# Patient Record
Sex: FEMALE | Race: WHITE | Employment: UNEMPLOYED | URBAN - METROPOLITAN AREA
[De-identification: names, ages, dates, MRNs, and addresses within clinical notes are randomized per-mention and may not be internally consistent; named-entity substitution may affect disease eponyms.]

---

## 2018-07-01 PROBLEM — R10.9 RIGHT FLANK PAIN: Status: ACTIVE | Noted: 2018-07-01

## 2018-07-01 PROBLEM — Z34.82 NORMAL PREGNANCY IN MULTIGRAVIDA IN SECOND TRIMESTER: Status: ACTIVE | Noted: 2018-07-01

## 2018-07-01 PROBLEM — Z32.02 PREGNANCY EXAMINATION OR TEST, NEGATIVE RESULT: Status: ACTIVE | Noted: 2018-07-01

## 2018-07-01 PROBLEM — R31.9 HEMATURIA: Status: ACTIVE | Noted: 2018-07-01

## 2018-09-30 ENCOUNTER — ANESTHESIA EVENT (OUTPATIENT)
Dept: LABOR AND DELIVERY | Age: 23
End: 2018-09-30
Payer: COMMERCIAL

## 2018-09-30 ENCOUNTER — ANESTHESIA (OUTPATIENT)
Dept: LABOR AND DELIVERY | Age: 23
End: 2018-09-30
Payer: COMMERCIAL

## 2018-09-30 ENCOUNTER — HOSPITAL ENCOUNTER (INPATIENT)
Age: 23
LOS: 2 days | Discharge: HOME | End: 2018-10-02
Attending: OBSTETRICS & GYNECOLOGY | Admitting: OBSTETRICS & GYNECOLOGY
Payer: COMMERCIAL

## 2018-09-30 DIAGNOSIS — G89.18 POST-OP PAIN: Primary | ICD-10-CM

## 2018-09-30 PROBLEM — O26.90 PREGNANCY RELATED CONDITION: Status: ACTIVE | Noted: 2018-09-30

## 2018-09-30 LAB
AMORPHOUS: ABNORMAL /HPF
AMPHETAMINES: ABNORMAL
BACTERIA: ABNORMAL /HPF
BARBITURATE SCREEN URINE: NEGATIVE
BENZODIAZEPINE SCREEN, URINE: NEGATIVE
BILIRUBIN URINE: NEGATIVE MG/DL
BLOOD, URINE: NEGATIVE
CANNABINOID SCREEN URINE: ABNORMAL
CLARITY: CLEAR
COCAINE METABOLITE: NEGATIVE
COLOR: YELLOW
GLUCOSE, URINE: NEGATIVE MG/DL
HCT VFR BLD CALC: 32.2 % (ref 37–47)
HEMOGLOBIN: 10.5 GM/DL (ref 12.5–16)
KETONES, URINE: NEGATIVE MG/DL
LEUKOCYTE ESTERASE, URINE: NEGATIVE
MCH RBC QN AUTO: 30.4 PG (ref 27–31)
MCHC RBC AUTO-ENTMCNC: 32.6 % (ref 32–36)
MCV RBC AUTO: 93.3 FL (ref 78–100)
MUCUS: ABNORMAL HPF
NITRITE URINE, QUANTITATIVE: NEGATIVE
OPIATES, URINE: NEGATIVE
OXYCODONE: NEGATIVE
PDW BLD-RTO: 13.3 % (ref 11.7–14.9)
PH, URINE: 7 (ref 5–8)
PHENCYCLIDINE, URINE: NEGATIVE
PLATELET # BLD: 271 K/CU MM (ref 140–440)
PMV BLD AUTO: 9.1 FL (ref 7.5–11.1)
PROTEIN UA: 30 MG/DL
RBC # BLD: 3.45 M/CU MM (ref 4.2–5.4)
RBC URINE: 4 /HPF (ref 0–6)
SPECIFIC GRAVITY UA: 1.02 (ref 1–1.03)
SQUAMOUS EPITHELIAL: 1 /HPF
TRANSITIONAL EPITHELIAL: <1 /HPF
TRICHOMONAS: ABNORMAL /HPF
UROBILINOGEN, URINE: 1 MG/DL (ref 0.2–1)
WBC # BLD: 8 K/CU MM (ref 4–10.5)
WBC UA: 1 /HPF (ref 0–5)

## 2018-09-30 PROCEDURE — 2580000003 HC RX 258

## 2018-09-30 PROCEDURE — 86900 BLOOD TYPING SEROLOGIC ABO: CPT

## 2018-09-30 PROCEDURE — G0480 DRUG TEST DEF 1-7 CLASSES: HCPCS

## 2018-09-30 PROCEDURE — 3E033VJ INTRODUCTION OF OTHER HORMONE INTO PERIPHERAL VEIN, PERCUTANEOUS APPROACH: ICD-10-PCS | Performed by: OBSTETRICS & GYNECOLOGY

## 2018-09-30 PROCEDURE — 85027 COMPLETE CBC AUTOMATED: CPT

## 2018-09-30 PROCEDURE — 1220000000 HC SEMI PRIVATE OB R&B

## 2018-09-30 PROCEDURE — 6360000002 HC RX W HCPCS

## 2018-09-30 PROCEDURE — 86850 RBC ANTIBODY SCREEN: CPT

## 2018-09-30 PROCEDURE — 6360000002 HC RX W HCPCS: Performed by: NURSE ANESTHETIST, CERTIFIED REGISTERED

## 2018-09-30 PROCEDURE — 2500000003 HC RX 250 WO HCPCS: Performed by: NURSE ANESTHETIST, CERTIFIED REGISTERED

## 2018-09-30 PROCEDURE — 3700000025 ANESTHESIA EPIDURAL BLOCK: Performed by: NURSE ANESTHETIST, CERTIFIED REGISTERED

## 2018-09-30 PROCEDURE — 86901 BLOOD TYPING SEROLOGIC RH(D): CPT

## 2018-09-30 PROCEDURE — 3E0R3BZ INTRODUCTION OF ANESTHETIC AGENT INTO SPINAL CANAL, PERCUTANEOUS APPROACH: ICD-10-PCS | Performed by: OBSTETRICS & GYNECOLOGY

## 2018-09-30 PROCEDURE — 81001 URINALYSIS AUTO W/SCOPE: CPT

## 2018-09-30 PROCEDURE — 2580000003 HC RX 258: Performed by: OBSTETRICS & GYNECOLOGY

## 2018-09-30 PROCEDURE — 6360000002 HC RX W HCPCS: Performed by: OBSTETRICS & GYNECOLOGY

## 2018-09-30 RX ORDER — ROPIVACAINE HYDROCHLORIDE 2 MG/ML
INJECTION, SOLUTION EPIDURAL; INFILTRATION; PERINEURAL PRN
Status: DISCONTINUED | OUTPATIENT
Start: 2018-09-30 | End: 2018-09-30 | Stop reason: SDUPTHER

## 2018-09-30 RX ORDER — RANITIDINE 150 MG/1
150 TABLET ORAL 2 TIMES DAILY PRN
COMMUNITY
End: 2021-09-16 | Stop reason: ALTCHOICE

## 2018-09-30 RX ORDER — LIDOCAINE HYDROCHLORIDE AND EPINEPHRINE 15; 5 MG/ML; UG/ML
INJECTION, SOLUTION EPIDURAL PRN
Status: DISCONTINUED | OUTPATIENT
Start: 2018-09-30 | End: 2018-09-30 | Stop reason: SDUPTHER

## 2018-09-30 RX ORDER — FENTANYL CITRATE 50 UG/ML
100 INJECTION, SOLUTION INTRAMUSCULAR; INTRAVENOUS
Status: DISCONTINUED | OUTPATIENT
Start: 2018-09-30 | End: 2018-10-01 | Stop reason: HOSPADM

## 2018-09-30 RX ORDER — ROPIVACAINE HYDROCHLORIDE 2 MG/ML
12 INJECTION, SOLUTION EPIDURAL; INFILTRATION; PERINEURAL CONTINUOUS
Status: DISCONTINUED | OUTPATIENT
Start: 2018-09-30 | End: 2018-10-02 | Stop reason: HOSPADM

## 2018-09-30 RX ORDER — SODIUM CHLORIDE, SODIUM LACTATE, POTASSIUM CHLORIDE, CALCIUM CHLORIDE 600; 310; 30; 20 MG/100ML; MG/100ML; MG/100ML; MG/100ML
INJECTION, SOLUTION INTRAVENOUS CONTINUOUS
Status: DISCONTINUED | OUTPATIENT
Start: 2018-09-30 | End: 2018-10-01

## 2018-09-30 RX ORDER — NALOXONE HYDROCHLORIDE 0.4 MG/ML
0.4 INJECTION, SOLUTION INTRAMUSCULAR; INTRAVENOUS; SUBCUTANEOUS PRN
Status: DISCONTINUED | OUTPATIENT
Start: 2018-09-30 | End: 2018-10-01 | Stop reason: HOSPADM

## 2018-09-30 RX ORDER — MISOPROSTOL 200 UG/1
800 TABLET ORAL PRN
Status: CANCELLED | OUTPATIENT
Start: 2018-09-30

## 2018-09-30 RX ORDER — TERBUTALINE SULFATE 1 MG/ML
0.25 INJECTION, SOLUTION SUBCUTANEOUS ONCE
Status: DISCONTINUED | OUTPATIENT
Start: 2018-09-30 | End: 2018-10-01 | Stop reason: HOSPADM

## 2018-09-30 RX ORDER — ONDANSETRON 2 MG/ML
4 INJECTION INTRAMUSCULAR; INTRAVENOUS EVERY 6 HOURS PRN
Status: DISCONTINUED | OUTPATIENT
Start: 2018-09-30 | End: 2018-10-01 | Stop reason: HOSPADM

## 2018-09-30 RX ORDER — NICOTINE 21 MG/24HR
1 PATCH, TRANSDERMAL 24 HOURS TRANSDERMAL DAILY
Status: CANCELLED | OUTPATIENT
Start: 2018-10-01

## 2018-09-30 RX ORDER — FENTANYL CITRATE 50 UG/ML
INJECTION, SOLUTION INTRAMUSCULAR; INTRAVENOUS PRN
Status: DISCONTINUED | OUTPATIENT
Start: 2018-09-30 | End: 2018-09-30 | Stop reason: SDUPTHER

## 2018-09-30 RX ORDER — NALBUPHINE HCL 10 MG/ML
5 AMPUL (ML) INJECTION EVERY 4 HOURS PRN
Status: DISCONTINUED | OUTPATIENT
Start: 2018-09-30 | End: 2018-10-01 | Stop reason: HOSPADM

## 2018-09-30 RX ORDER — FAMOTIDINE 20 MG/1
20 TABLET, FILM COATED ORAL 2 TIMES DAILY
COMMUNITY
End: 2021-09-16 | Stop reason: ALTCHOICE

## 2018-09-30 RX ORDER — METHYLERGONOVINE MALEATE 0.2 MG/ML
200 INJECTION INTRAVENOUS
Status: CANCELLED | OUTPATIENT
Start: 2018-09-30 | End: 2018-09-30

## 2018-09-30 RX ORDER — SODIUM CHLORIDE, SODIUM LACTATE, POTASSIUM CHLORIDE, CALCIUM CHLORIDE 600; 310; 30; 20 MG/100ML; MG/100ML; MG/100ML; MG/100ML
INJECTION, SOLUTION INTRAVENOUS
Status: COMPLETED
Start: 2018-09-30 | End: 2018-09-30

## 2018-09-30 RX ADMIN — SODIUM CHLORIDE, POTASSIUM CHLORIDE, SODIUM LACTATE AND CALCIUM CHLORIDE: 600; 310; 30; 20 INJECTION, SOLUTION INTRAVENOUS at 08:46

## 2018-09-30 RX ADMIN — ROPIVACAINE HYDROCHLORIDE 5 ML: 2 INJECTION, SOLUTION EPIDURAL; INFILTRATION at 18:38

## 2018-09-30 RX ADMIN — SODIUM CHLORIDE, POTASSIUM CHLORIDE, SODIUM LACTATE AND CALCIUM CHLORIDE: 600; 310; 30; 20 INJECTION, SOLUTION INTRAVENOUS at 16:49

## 2018-09-30 RX ADMIN — ROPIVACAINE HYDROCHLORIDE 4 ML: 2 INJECTION, SOLUTION EPIDURAL; INFILTRATION at 20:01

## 2018-09-30 RX ADMIN — LIDOCAINE HYDROCHLORIDE,EPINEPHRINE BITARTRATE 3 ML: 15; .005 INJECTION, SOLUTION EPIDURAL; INFILTRATION; INTRACAUDAL; PERINEURAL at 18:34

## 2018-09-30 RX ADMIN — ROPIVACAINE HYDROCHLORIDE 10 ML: 2 INJECTION, SOLUTION EPIDURAL; INFILTRATION at 22:20

## 2018-09-30 RX ADMIN — ROPIVACAINE HYDROCHLORIDE 12 ML/HR: 2 INJECTION, SOLUTION EPIDURAL; INFILTRATION at 18:42

## 2018-09-30 RX ADMIN — LIDOCAINE HYDROCHLORIDE,EPINEPHRINE BITARTRATE 3 ML: 15; .005 INJECTION, SOLUTION EPIDURAL; INFILTRATION; INTRACAUDAL; PERINEURAL at 22:20

## 2018-09-30 RX ADMIN — SODIUM CHLORIDE, POTASSIUM CHLORIDE, SODIUM LACTATE AND CALCIUM CHLORIDE: 600; 310; 30; 20 INJECTION, SOLUTION INTRAVENOUS at 18:53

## 2018-09-30 RX ADMIN — ROPIVACAINE HYDROCHLORIDE 4 ML: 2 INJECTION, SOLUTION EPIDURAL; INFILTRATION at 20:44

## 2018-09-30 RX ADMIN — ONDANSETRON 4 MG: 2 INJECTION INTRAMUSCULAR; INTRAVENOUS at 20:37

## 2018-09-30 RX ADMIN — Medication 1 MILLI-UNITS/MIN: at 08:50

## 2018-09-30 RX ADMIN — ROPIVACAINE HYDROCHLORIDE 5 ML: 2 INJECTION, SOLUTION EPIDURAL; INFILTRATION at 18:40

## 2018-09-30 RX ADMIN — FENTANYL CITRATE 100 MCG: 50 INJECTION INTRAMUSCULAR; INTRAVENOUS at 20:44

## 2018-09-30 ASSESSMENT — PAIN DESCRIPTION - PROGRESSION
CLINICAL_PROGRESSION: GRADUALLY IMPROVING
CLINICAL_PROGRESSION: GRADUALLY IMPROVING
CLINICAL_PROGRESSION: NOT CHANGED
CLINICAL_PROGRESSION: NOT CHANGED
CLINICAL_PROGRESSION: GRADUALLY WORSENING
CLINICAL_PROGRESSION: NOT CHANGED
CLINICAL_PROGRESSION: GRADUALLY WORSENING
CLINICAL_PROGRESSION: NOT CHANGED
CLINICAL_PROGRESSION: GRADUALLY WORSENING
CLINICAL_PROGRESSION: GRADUALLY IMPROVING
CLINICAL_PROGRESSION: GRADUALLY WORSENING

## 2018-09-30 ASSESSMENT — PAIN DESCRIPTION - PAIN TYPE
TYPE: ACUTE PAIN

## 2018-09-30 ASSESSMENT — PAIN DESCRIPTION - ORIENTATION
ORIENTATION: LOWER;MID

## 2018-09-30 ASSESSMENT — PAIN DESCRIPTION - FREQUENCY
FREQUENCY: INTERMITTENT

## 2018-09-30 ASSESSMENT — PAIN DESCRIPTION - DESCRIPTORS
DESCRIPTORS: ACHING
DESCRIPTORS: ACHING;CRAMPING
DESCRIPTORS: ACHING;SHARP
DESCRIPTORS: ACHING;CRAMPING
DESCRIPTORS: ACHING
DESCRIPTORS: ACHING
DESCRIPTORS: ACHING;CRAMPING
DESCRIPTORS: ACHING
DESCRIPTORS: ACHING
DESCRIPTORS: ACHING;CRAMPING

## 2018-09-30 ASSESSMENT — PAIN SCALES - GENERAL
PAINLEVEL_OUTOF10: 5
PAINLEVEL_OUTOF10: 6
PAINLEVEL_OUTOF10: 6
PAINLEVEL_OUTOF10: 8
PAINLEVEL_OUTOF10: 6
PAINLEVEL_OUTOF10: 6

## 2018-09-30 ASSESSMENT — PAIN DESCRIPTION - ONSET
ONSET: ON-GOING

## 2018-09-30 ASSESSMENT — PAIN DESCRIPTION - LOCATION
LOCATION: ABDOMEN

## 2018-09-30 NOTE — PROGRESS NOTES
Patient arrives ambulatory to birthing center for scheduled induction. Escorted to LD04. Instructed to change into gown and need of urine sample. Patient states she is unable to urinate as she just went. No further questions.

## 2018-09-30 NOTE — ANESTHESIA PRE PROCEDURE
units in 500 mL infusion  1 ant-units/min Intravenous Continuous PRN Khoa Lance MD       Wang Farley [START ON 10/1/2018] influenza quadrivalent split vaccine (FLUZONE;FLUARIX;FLULAVAL;AFLURIA) injection 0.5 mL  0.5 mL Intramuscular Once Khoa Lance MD           Allergies:  No Known Allergies    Problem List:    Patient Active Problem List   Diagnosis Code    Pregnancy examination or test, negative result Z32.02    Right flank pain R10.9    Hematuria R31.9    Normal pregnancy in multigravida in second trimester Z34.82    Pregnancy related condition O26.90       Past Medical History:        Diagnosis Date    UTI (urinary tract infection)        Past Surgical History:  History reviewed. No pertinent surgical history. Social History:    Social History   Substance Use Topics    Smoking status: Former Smoker    Smokeless tobacco: Never Used    Alcohol use No      Comment: occasionally, stopped when she found out she was pregnant                                Counseling given: Not Answered      Vital Signs (Current):   Vitals:    09/30/18 1400 09/30/18 1447 09/30/18 1600 09/30/18 1656   BP: 101/64 122/67 115/71 119/65   Pulse: 81 63 73 64   Resp: 20 17 18    Temp: 36.4 °C (97.5 °F) 36.4 °C (97.5 °F) 36.5 °C (97.7 °F)    TempSrc: Temporal Temporal Temporal    Weight:       Height:                                                  BP Readings from Last 3 Encounters:   09/30/18 119/65   07/02/18 (!) 100/52   06/21/18 112/81       NPO Status: Time of last liquid consumption: 0818                        Time of last solid consumption: 0818                        Date of last liquid consumption: 09/30/18                        Date of last solid food consumption: 09/30/18    BMI:   Wt Readings from Last 3 Encounters:   09/30/18 164 lb (74.4 kg)   07/01/18 150 lb (68 kg)   06/21/18 148 lb (67.1 kg)     Body mass index is 24.94 kg/m².     CBC:   Lab Results   Component Value Date    WBC 8.0 09/30/2018 RBC 3.45 09/30/2018    HGB 10.5 09/30/2018    HCT 32.2 09/30/2018    MCV 93.3 09/30/2018    RDW 13.3 09/30/2018     09/30/2018       CMP:   Lab Results   Component Value Date     05/01/2018    K 3.9 05/01/2018     05/01/2018    CO2 25 05/01/2018    BUN 6 05/01/2018    CREATININE 0.6 05/01/2018    GFRAA >60 05/01/2018    LABGLOM >60 05/01/2018    GLUCOSE 110 05/01/2018    PROT 6.6 05/01/2018    PROT 7.8 10/11/2011    CALCIUM 9.3 05/01/2018    BILITOT 0.3 05/01/2018    ALKPHOS 38 05/01/2018    AST 16 05/01/2018    ALT 8 05/01/2018       POC Tests: No results for input(s): POCGLU, POCNA, POCK, POCCL, POCBUN, POCHEMO, POCHCT in the last 72 hours. Coags: No results found for: PROTIME, INR, APTT    HCG (If Applicable):   Lab Results   Component Value Date    PREGTESTUR NEGATIVE 10/11/2011        ABGs: No results found for: PHART, PO2ART, TJX6PXT, YCW3BUH, BEART, C7EWHIFE     Type & Screen (If Applicable):  No results found for: LABABO, [address]    Anesthesia Evaluation  Patient summary reviewed and Nursing notes reviewed  Airway: Mallampati: I        Dental: normal exam         Pulmonary:Negative Pulmonary ROS and normal exam                               Cardiovascular:Negative CV ROS  Exercise tolerance: good (>4 METS),         ECG reviewed               Beta Blocker:  Not on Beta Blocker      ROS comment: ECG:  Normal sinus rhythm   Low voltage QRS   Borderline ECG   No previous ECGs available     Neuro/Psych:   Negative Neuro/Psych ROS              GI/Hepatic/Renal: Neg GI/Hepatic/Renal ROS            Endo/Other: Negative Endo/Other ROS                    Abdominal:           Vascular: negative vascular ROS. Anesthesia Plan      epidural     ASA 2             Anesthetic plan and risks discussed with patient and spouse. Plan discussed with CRNA.                 Adolfo Yañez, APRN - CRNA   9/30/2018

## 2018-10-01 PROCEDURE — 51701 INSERT BLADDER CATHETER: CPT

## 2018-10-01 PROCEDURE — 6370000000 HC RX 637 (ALT 250 FOR IP): Performed by: OBSTETRICS & GYNECOLOGY

## 2018-10-01 PROCEDURE — 3700000025 ANESTHESIA EPIDURAL BLOCK: Performed by: NURSE ANESTHETIST, CERTIFIED REGISTERED

## 2018-10-01 PROCEDURE — 1220000000 HC SEMI PRIVATE OB R&B

## 2018-10-01 PROCEDURE — 7200000001 HC VAGINAL DELIVERY

## 2018-10-01 RX ORDER — IBUPROFEN 800 MG/1
800 TABLET ORAL EVERY 8 HOURS
Status: DISCONTINUED | OUTPATIENT
Start: 2018-10-01 | End: 2018-10-02 | Stop reason: HOSPADM

## 2018-10-01 RX ORDER — HYDROCODONE BITARTRATE AND ACETAMINOPHEN 5; 325 MG/1; MG/1
2 TABLET ORAL EVERY 6 HOURS PRN
Status: DISCONTINUED | OUTPATIENT
Start: 2018-10-01 | End: 2018-10-02 | Stop reason: HOSPADM

## 2018-10-01 RX ORDER — DOCUSATE SODIUM 100 MG/1
100 CAPSULE, LIQUID FILLED ORAL 2 TIMES DAILY
Status: DISCONTINUED | OUTPATIENT
Start: 2018-10-01 | End: 2018-10-02 | Stop reason: HOSPADM

## 2018-10-01 RX ORDER — SODIUM CHLORIDE 0.9 % (FLUSH) 0.9 %
10 SYRINGE (ML) INJECTION EVERY 12 HOURS SCHEDULED
Status: DISCONTINUED | OUTPATIENT
Start: 2018-10-01 | End: 2018-10-02 | Stop reason: HOSPADM

## 2018-10-01 RX ORDER — ACETAMINOPHEN 325 MG/1
650 TABLET ORAL EVERY 4 HOURS PRN
Status: DISCONTINUED | OUTPATIENT
Start: 2018-10-01 | End: 2018-10-02 | Stop reason: HOSPADM

## 2018-10-01 RX ORDER — HYDROCODONE BITARTRATE AND ACETAMINOPHEN 5; 325 MG/1; MG/1
1 TABLET ORAL EVERY 6 HOURS PRN
Status: DISCONTINUED | OUTPATIENT
Start: 2018-10-01 | End: 2018-10-02 | Stop reason: HOSPADM

## 2018-10-01 RX ORDER — ONDANSETRON 4 MG/1
4 TABLET, ORALLY DISINTEGRATING ORAL EVERY 6 HOURS PRN
Status: DISCONTINUED | OUTPATIENT
Start: 2018-10-01 | End: 2018-10-02 | Stop reason: HOSPADM

## 2018-10-01 RX ORDER — SODIUM CHLORIDE 0.9 % (FLUSH) 0.9 %
10 SYRINGE (ML) INJECTION PRN
Status: DISCONTINUED | OUTPATIENT
Start: 2018-10-01 | End: 2018-10-02 | Stop reason: HOSPADM

## 2018-10-01 RX ORDER — SIMETHICONE 80 MG
80 TABLET,CHEWABLE ORAL EVERY 6 HOURS PRN
Status: DISCONTINUED | OUTPATIENT
Start: 2018-10-01 | End: 2018-10-02 | Stop reason: HOSPADM

## 2018-10-01 RX ORDER — LANOLIN 100 %
OINTMENT (GRAM) TOPICAL PRN
Status: DISCONTINUED | OUTPATIENT
Start: 2018-10-01 | End: 2018-10-02 | Stop reason: HOSPADM

## 2018-10-01 RX ADMIN — IBUPROFEN 800 MG: 800 TABLET ORAL at 04:25

## 2018-10-01 RX ADMIN — HYDROCODONE BITARTRATE AND ACETAMINOPHEN 1 TABLET: 5; 325 TABLET ORAL at 09:18

## 2018-10-01 RX ADMIN — IBUPROFEN 800 MG: 800 TABLET ORAL at 22:27

## 2018-10-01 RX ADMIN — HYDROCODONE BITARTRATE AND ACETAMINOPHEN 1 TABLET: 5; 325 TABLET ORAL at 20:27

## 2018-10-01 RX ADMIN — HYDROCODONE BITARTRATE AND ACETAMINOPHEN 1 TABLET: 5; 325 TABLET ORAL at 04:24

## 2018-10-01 RX ADMIN — DOCUSATE SODIUM 100 MG: 100 CAPSULE, LIQUID FILLED ORAL at 09:18

## 2018-10-01 RX ADMIN — IBUPROFEN 800 MG: 800 TABLET ORAL at 12:47

## 2018-10-01 RX ADMIN — HYDROCODONE BITARTRATE AND ACETAMINOPHEN 2 TABLET: 5; 325 TABLET ORAL at 14:49

## 2018-10-01 RX ADMIN — DOCUSATE SODIUM 100 MG: 100 CAPSULE, LIQUID FILLED ORAL at 20:27

## 2018-10-01 ASSESSMENT — PAIN SCALES - GENERAL
PAINLEVEL_OUTOF10: 4
PAINLEVEL_OUTOF10: 5
PAINLEVEL_OUTOF10: 4
PAINLEVEL_OUTOF10: 2
PAINLEVEL_OUTOF10: 2
PAINLEVEL_OUTOF10: 8
PAINLEVEL_OUTOF10: 5
PAINLEVEL_OUTOF10: 2

## 2018-10-01 ASSESSMENT — ENCOUNTER SYMPTOMS: BACK PAIN: 1

## 2018-10-01 NOTE — L&D DELIVERY NOTE
Mother's Information    Labor Events     labor?:  No  Rupture type:  Artificial=AROM  Fluid color:  Clear  Fluid odor:  None     Mother Delivery Information    Surgical or Additional Est. Blood Loss (mL):  0 (View Only):  Edit in Flowsheets   Combined Est. Blood Loss (mL):  0        Jose Armando Baby Pending  Nalini [[id number]]    Events of Labor     labor?:  No   steroids?:  None  Cervical ripening date/time:     Antibiotics received during labor?:  No  Rupture date/time: 18 1015   Rupture type:  Artificial=AROM  Fluid color:  Clear  Fluid odor:  None  Induction:  Oxytocin, AROM     Print Group Title    Labor onset date/time: 18 1300   Dilation complete date/time:   18   Start pushin2018   Decision time (emergent ): Anesthesia    Method:  Epidural      Information     Changing the 's delivery date/time could affect patient care.:     Delivery date/time:   18   Delivery type:  Vaginal, Spontaneous Delivery    Details:         Delivery Information    Surgical or additional est. blood loss (mL):  0 (View Only):  Edit in Flowsheets   Combined est. blood loss (mL):  0            Department of Obstetrics and Gynecology  Spontaneous Vaginal Delivery Note         Pre-operative Diagnosis:  Term pregnancy and intrauterine growth restriction    Post-operative Diagnosis:  Same + live female    Procedure:  Spontaneous vaginal delivery    Surgeon:  Evette Yan    Information for the patient's :  Asim Powell, [address] [[id number]]          Anesthesia:  epidural anesthesia    Estimated blood loss:  300ml    Specimen:  Placenta not sent to pathology     Cord blood sent No    Complications:  none    Condition:  infant stable to general nursery    Details of Procedure:    The patient is a 20 y.o. female at 41w10d   OB History      Para Term  AB Living    3 1 1   1 1    SAB TAB Ectopic Molar Multiple

## 2018-10-01 NOTE — PLAN OF CARE
Problem: Pain:  Goal: Control of acute pain  Control of acute pain   Outcome: Completed Date Met: 10/01/18    Goal: Control of chronic pain  Control of chronic pain   Outcome: Completed Date Met: 10/01/18
parameters  Outcome: Ongoing

## 2018-10-02 VITALS
WEIGHT: 164 LBS | HEART RATE: 71 BPM | RESPIRATION RATE: 16 BRPM | BODY MASS INDEX: 24.86 KG/M2 | OXYGEN SATURATION: 99 % | TEMPERATURE: 97.8 F | HEIGHT: 68 IN | SYSTOLIC BLOOD PRESSURE: 110 MMHG | DIASTOLIC BLOOD PRESSURE: 60 MMHG

## 2018-10-02 PROCEDURE — 6360000002 HC RX W HCPCS: Performed by: OBSTETRICS & GYNECOLOGY

## 2018-10-02 PROCEDURE — 6370000000 HC RX 637 (ALT 250 FOR IP): Performed by: OBSTETRICS & GYNECOLOGY

## 2018-10-02 PROCEDURE — 90686 IIV4 VACC NO PRSV 0.5 ML IM: CPT | Performed by: OBSTETRICS & GYNECOLOGY

## 2018-10-02 PROCEDURE — G0008 ADMIN INFLUENZA VIRUS VAC: HCPCS | Performed by: OBSTETRICS & GYNECOLOGY

## 2018-10-02 PROCEDURE — 90715 TDAP VACCINE 7 YRS/> IM: CPT | Performed by: OBSTETRICS & GYNECOLOGY

## 2018-10-02 PROCEDURE — 90471 IMMUNIZATION ADMIN: CPT | Performed by: OBSTETRICS & GYNECOLOGY

## 2018-10-02 RX ORDER — IBUPROFEN 800 MG/1
800 TABLET ORAL EVERY 8 HOURS
Qty: 120 TABLET | Refills: 3 | Status: SHIPPED | OUTPATIENT
Start: 2018-10-02 | End: 2021-09-16 | Stop reason: ALTCHOICE

## 2018-10-02 RX ORDER — HYDROCODONE BITARTRATE AND ACETAMINOPHEN 5; 325 MG/1; MG/1
1 TABLET ORAL EVERY 6 HOURS PRN
Qty: 20 TABLET | Refills: 0 | Status: SHIPPED | OUTPATIENT
Start: 2018-10-02 | End: 2018-10-09

## 2018-10-02 RX ADMIN — IBUPROFEN 800 MG: 800 TABLET ORAL at 06:41

## 2018-10-02 RX ADMIN — INFLUENZA A VIRUS A/MICHIGAN/45/2015 X-275 (H1N1) ANTIGEN (FORMALDEHYDE INACTIVATED), INFLUENZA A VIRUS A/SINGAPORE/INFIMH-16-0019/2016 IVR-186 (H3N2) ANTIGEN (FORMALDEHYDE INACTIVATED), INFLUENZA B VIRUS B/PHUKET/3073/2013 ANTIGEN (FORMALDEHYDE INACTIVATED), AND INFLUENZA B VIRUS B/MARYLAND/15/2016 BX-69A ANTIGEN (FORMALDEHYDE INACTIVATED) 0.5 ML: 15; 15; 15; 15 INJECTION, SUSPENSION INTRAMUSCULAR at 11:52

## 2018-10-02 RX ADMIN — TETANUS TOXOID, REDUCED DIPHTHERIA TOXOID AND ACELLULAR PERTUSSIS VACCINE, ADSORBED 0.5 ML: 5; 2.5; 8; 8; 2.5 SUSPENSION INTRAMUSCULAR at 11:51

## 2018-10-02 RX ADMIN — DOCUSATE SODIUM 100 MG: 100 CAPSULE, LIQUID FILLED ORAL at 09:30

## 2018-10-02 ASSESSMENT — PAIN SCALES - GENERAL: PAINLEVEL_OUTOF10: 4

## 2018-10-02 NOTE — FLOWSHEET NOTE
ID'd with Baby. Discussed discharge instructions and followup appts. Rx for Norco given and Motrin is [address] To see Dr. Sara Zazueta in 6 weeks or sooner if needed. Tdap and Flu vaccines given.  saw before discharge due to pos mj and amphetamines. Discharged ambulatory with fob and Baby secured in carseat. Is pink with no apparent distress.

## 2018-10-02 NOTE — CARE COORDINATION
LSW spoke with pt about discharge plans. FOB in room at visit. Pt was informed that sensitve medical information would be discussed during LSW's visit. Pt stated that FOB can remain in room while this LSW spoke with pt. Pt lives with FOB Javi Gibbs. Pt plans to bottle feed. LSW plans to apply to [facility] after discharge. Pt denies needing any material items. LSW spoke with pt about [facility] ans she is not interested at this time. Pt plans to take baby to [facility]. LSW spoke with pt about her + MJ. Pt stated she took a hit off someone's Vap and she did not know there was MJ in it. Baby is negative. LSW explained that if meconium is + for any drugs a referral to CS will be completed. Pt stated understanding.   Baby's Shanika Short

## 2019-08-24 ENCOUNTER — APPOINTMENT (OUTPATIENT)
Dept: CT IMAGING | Age: 24
End: 2019-08-24
Payer: COMMERCIAL

## 2019-08-24 ENCOUNTER — HOSPITAL ENCOUNTER (EMERGENCY)
Age: 24
Discharge: HOME | End: 2019-08-24
Payer: COMMERCIAL

## 2019-08-24 VITALS
BODY MASS INDEX: 22.96 KG/M2 | OXYGEN SATURATION: 100 % | HEIGHT: 69 IN | RESPIRATION RATE: 15 BRPM | HEART RATE: 54 BPM | TEMPERATURE: 98 F | DIASTOLIC BLOOD PRESSURE: 89 MMHG | SYSTOLIC BLOOD PRESSURE: 113 MMHG | WEIGHT: 155 LBS

## 2019-08-24 DIAGNOSIS — R10.31 RIGHT LOWER QUADRANT ABDOMINAL PAIN: Primary | ICD-10-CM

## 2019-08-24 LAB
ALBUMIN SERPL-MCNC: 4.3 GM/DL (ref 3.4–5)
ALP BLD-CCNC: 52 IU/L (ref 40–129)
ALT SERPL-CCNC: 13 U/L (ref 10–40)
ANION GAP SERPL CALCULATED.3IONS-SCNC: 10 MMOL/L (ref 4–16)
AST SERPL-CCNC: 19 IU/L (ref 15–37)
BACTERIA: NEGATIVE /HPF
BASOPHILS ABSOLUTE: 0 K/CU MM
BASOPHILS RELATIVE PERCENT: 0.4 % (ref 0–1)
BILIRUB SERPL-MCNC: 0.3 MG/DL (ref 0–1)
BILIRUBIN URINE: NEGATIVE MG/DL
BLOOD, URINE: NEGATIVE
BUN BLDV-MCNC: 11 MG/DL (ref 6–23)
CALCIUM SERPL-MCNC: 9.1 MG/DL (ref 8.3–10.6)
CHLORIDE BLD-SCNC: 102 MMOL/L (ref 99–110)
CLARITY: CLEAR
CO2: 25 MMOL/L (ref 21–32)
COLOR: YELLOW
CREAT SERPL-MCNC: 0.9 MG/DL (ref 0.6–1.1)
DIFFERENTIAL TYPE: ABNORMAL
EOSINOPHILS ABSOLUTE: 0.1 K/CU MM
EOSINOPHILS RELATIVE PERCENT: 1.2 % (ref 0–3)
GFR AFRICAN AMERICAN: >60 ML/MIN/1.73M2
GFR NON-AFRICAN AMERICAN: >60 ML/MIN/1.73M2
GLUCOSE BLD-MCNC: 86 MG/DL (ref 70–99)
GLUCOSE, URINE: NEGATIVE MG/DL
HCG QUALITATIVE: NEGATIVE
HCT VFR BLD CALC: 39.2 % (ref 37–47)
HEMOGLOBIN: 12.9 GM/DL (ref 12.5–16)
IMMATURE NEUTROPHIL %: 0.3 % (ref 0–0.43)
KETONES, URINE: NEGATIVE MG/DL
LEUKOCYTE ESTERASE, URINE: NEGATIVE
LIPASE: 17 IU/L (ref 13–60)
LYMPHOCYTES ABSOLUTE: 2.2 K/CU MM
LYMPHOCYTES RELATIVE PERCENT: 31.6 % (ref 24–44)
MCH RBC QN AUTO: 31.7 PG (ref 27–31)
MCHC RBC AUTO-ENTMCNC: 32.9 % (ref 32–36)
MCV RBC AUTO: 96.3 FL (ref 78–100)
MONOCYTES ABSOLUTE: 0.7 K/CU MM
MONOCYTES RELATIVE PERCENT: 9.6 % (ref 0–4)
MUCUS: ABNORMAL HPF
NITRITE URINE, QUANTITATIVE: NEGATIVE
NUCLEATED RBC %: 0 %
PDW BLD-RTO: 11.6 % (ref 11.7–14.9)
PH, URINE: 6 (ref 5–8)
PLATELET # BLD: 273 K/CU MM (ref 140–440)
PMV BLD AUTO: 9.2 FL (ref 7.5–11.1)
POTASSIUM SERPL-SCNC: 3.8 MMOL/L (ref 3.5–5.1)
PROTEIN UA: NEGATIVE MG/DL
RBC # BLD: 4.07 M/CU MM (ref 4.2–5.4)
RBC URINE: <1 /HPF (ref 0–6)
SEGMENTED NEUTROPHILS ABSOLUTE COUNT: 3.9 K/CU MM
SEGMENTED NEUTROPHILS RELATIVE PERCENT: 56.9 % (ref 36–66)
SODIUM BLD-SCNC: 137 MMOL/L (ref 135–145)
SPECIFIC GRAVITY UA: 1.02 (ref 1–1.03)
SQUAMOUS EPITHELIAL: 2 /HPF
TOTAL IMMATURE NEUTOROPHIL: 0.02 K/CU MM
TOTAL NUCLEATED RBC: 0 K/CU MM
TOTAL PROTEIN: 6.8 GM/DL (ref 6.4–8.2)
TRICHOMONAS: ABNORMAL /HPF
UROBILINOGEN, URINE: NORMAL MG/DL (ref 0.2–1)
WBC # BLD: 6.9 K/CU MM (ref 4–10.5)
WBC UA: 1 /HPF (ref 0–5)

## 2019-08-24 PROCEDURE — 85025 COMPLETE CBC W/AUTO DIFF WBC: CPT

## 2019-08-24 PROCEDURE — 36415 COLL VENOUS BLD VENIPUNCTURE: CPT

## 2019-08-24 PROCEDURE — 96374 THER/PROPH/DIAG INJ IV PUSH: CPT

## 2019-08-24 PROCEDURE — 6360000002 HC RX W HCPCS: Performed by: NURSE PRACTITIONER

## 2019-08-24 PROCEDURE — 80053 COMPREHEN METABOLIC PANEL: CPT

## 2019-08-24 PROCEDURE — 81001 URINALYSIS AUTO W/SCOPE: CPT

## 2019-08-24 PROCEDURE — 6360000004 HC RX CONTRAST MEDICATION: Performed by: NURSE PRACTITIONER

## 2019-08-24 PROCEDURE — 74177 CT ABD & PELVIS W/CONTRAST: CPT

## 2019-08-24 PROCEDURE — 2580000003 HC RX 258: Performed by: NURSE PRACTITIONER

## 2019-08-24 PROCEDURE — 83690 ASSAY OF LIPASE: CPT

## 2019-08-24 PROCEDURE — 96375 TX/PRO/DX INJ NEW DRUG ADDON: CPT

## 2019-08-24 PROCEDURE — 84703 CHORIONIC GONADOTROPIN ASSAY: CPT

## 2019-08-24 PROCEDURE — 99284 EMERGENCY DEPT VISIT MOD MDM: CPT

## 2019-08-24 RX ORDER — ONDANSETRON 2 MG/ML
4 INJECTION INTRAMUSCULAR; INTRAVENOUS ONCE
Status: COMPLETED | OUTPATIENT
Start: 2019-08-24 | End: 2019-08-24

## 2019-08-24 RX ORDER — 0.9 % SODIUM CHLORIDE 0.9 %
1000 INTRAVENOUS SOLUTION INTRAVENOUS ONCE
Status: COMPLETED | OUTPATIENT
Start: 2019-08-24 | End: 2019-08-24

## 2019-08-24 RX ORDER — KETOROLAC TROMETHAMINE 30 MG/ML
30 INJECTION, SOLUTION INTRAMUSCULAR; INTRAVENOUS ONCE
Status: COMPLETED | OUTPATIENT
Start: 2019-08-24 | End: 2019-08-24

## 2019-08-24 RX ORDER — DICYCLOMINE HYDROCHLORIDE 10 MG/1
10 CAPSULE ORAL EVERY 6 HOURS PRN
Qty: 20 CAPSULE | Refills: 0 | Status: SHIPPED | OUTPATIENT
Start: 2019-08-24 | End: 2021-09-16 | Stop reason: ALTCHOICE

## 2019-08-24 RX ORDER — 0.9 % SODIUM CHLORIDE 0.9 %
10 VIAL (ML) INJECTION
Status: COMPLETED | OUTPATIENT
Start: 2019-08-24 | End: 2019-08-24

## 2019-08-24 RX ADMIN — Medication 10 ML: at 16:16

## 2019-08-24 RX ADMIN — ONDANSETRON 4 MG: 2 INJECTION INTRAMUSCULAR; INTRAVENOUS at 15:33

## 2019-08-24 RX ADMIN — SODIUM CHLORIDE 1000 ML: 9 INJECTION, SOLUTION INTRAVENOUS at 15:33

## 2019-08-24 RX ADMIN — KETOROLAC TROMETHAMINE 30 MG: 30 INJECTION, SOLUTION INTRAMUSCULAR at 15:33

## 2019-08-24 RX ADMIN — IOPAMIDOL 75 ML: 755 INJECTION, SOLUTION INTRAVENOUS at 16:16

## 2019-08-24 ASSESSMENT — ENCOUNTER SYMPTOMS
CONSTIPATION: 0
DIARRHEA: 0
RHINORRHEA: 0
SHORTNESS OF BREATH: 0
ABDOMINAL PAIN: 1
NAUSEA: 0
VOMITING: 0
BLOOD IN STOOL: 0
SORE THROAT: 0

## 2019-08-24 ASSESSMENT — PAIN DESCRIPTION - FREQUENCY: FREQUENCY: INTERMITTENT

## 2019-08-24 ASSESSMENT — PAIN DESCRIPTION - LOCATION: LOCATION: ABDOMEN

## 2019-08-24 ASSESSMENT — PAIN DESCRIPTION - ORIENTATION: ORIENTATION: RIGHT;LOWER

## 2019-08-24 ASSESSMENT — PAIN SCALES - GENERAL
PAINLEVEL_OUTOF10: 8
PAINLEVEL_OUTOF10: 7

## 2019-08-24 ASSESSMENT — PAIN DESCRIPTION - DESCRIPTORS: DESCRIPTORS: STABBING

## 2019-08-24 ASSESSMENT — PAIN DESCRIPTION - PAIN TYPE: TYPE: ACUTE PAIN

## 2019-08-24 NOTE — ED PROVIDER NOTES
service: None     Active member of club or organization: None     Attends meetings of clubs or organizations: None     Relationship status: None    Intimate partner violence:     Fear of current or ex partner: None     Emotionally abused: None     Physically abused: None     Forced sexual activity: None   Other Topics Concern    None   Social History Narrative    None       SCREENINGS             PHYSICAL EXAM  (up to 7 for level 4, 8 or more for level 5)     ED Triage Vitals [08/24/19 1427]   BP Temp Temp Source Pulse Resp SpO2 Height Weight   113/89 98 °F (36.7 °C) Oral 54 15 100 % 5' 9\" (1.753 m) 155 lb (70.3 kg)       Physical Exam   Constitutional: She is oriented to person, place, and time. She appears well-developed and well-nourished. No distress. HENT:   Head: Normocephalic and atraumatic. Eyes: Right eye exhibits no discharge. Left eye exhibits no discharge. No scleral icterus. Neck: Normal range of motion. Neck supple. Cardiovascular: Normal rate, regular rhythm, normal heart sounds and intact distal pulses. Exam reveals no gallop and no friction rub. No murmur heard. Pulmonary/Chest: Effort normal and breath sounds normal. No stridor. No respiratory distress. She has no wheezes. She has no rales. She exhibits no tenderness. Abdominal: Soft. Bowel sounds are normal. She exhibits no distension and no mass. There is tenderness in the right lower quadrant. There is no rebound and no guarding. Musculoskeletal: Normal range of motion. She exhibits no edema or tenderness. Neurological: She is alert and oriented to person, place, and time. Coordination normal.   Skin: Skin is warm and dry. She is not diaphoretic. No pallor. Psychiatric: She has a normal mood and affect. Her behavior is normal.   Nursing note and vitals reviewed.       DIAGNOSTIC RESULTS   LABS:    Labs Reviewed   CBC WITH AUTO DIFFERENTIAL - Abnormal; Notable for the following components:       Result Value    RBC 4.07 (*)

## 2021-08-19 ENCOUNTER — HOSPITAL ENCOUNTER (EMERGENCY)
Age: 26
Discharge: LEFT BEFORE DISCHARGE | End: 2021-08-19
Payer: MEDICAID

## 2021-08-19 VITALS
OXYGEN SATURATION: 97 % | HEART RATE: 78 BPM | TEMPERATURE: 98.1 F | RESPIRATION RATE: 16 BRPM | SYSTOLIC BLOOD PRESSURE: 118 MMHG | DIASTOLIC BLOOD PRESSURE: 81 MMHG

## 2021-08-19 LAB
BACTERIA: NEGATIVE /HPF
BILIRUBIN URINE: NEGATIVE MG/DL
BLOOD, URINE: NEGATIVE
CLARITY: ABNORMAL
COLOR: YELLOW
GLUCOSE, URINE: NEGATIVE MG/DL
KETONES, URINE: NEGATIVE MG/DL
LEUKOCYTE ESTERASE, URINE: ABNORMAL
MUCUS: ABNORMAL HPF
NITRITE URINE, QUANTITATIVE: NEGATIVE
PH, URINE: 7 (ref 5–8)
PROTEIN UA: 30 MG/DL
RBC URINE: 2 /HPF (ref 0–6)
SPECIFIC GRAVITY UA: 1.02 (ref 1–1.03)
SQUAMOUS EPITHELIAL: 14 /HPF
TRICHOMONAS: ABNORMAL /HPF
UROBILINOGEN, URINE: NEGATIVE MG/DL (ref 0.2–1)
WBC UA: 1 /HPF (ref 0–5)

## 2021-08-19 PROCEDURE — 85025 COMPLETE CBC W/AUTO DIFF WBC: CPT

## 2021-08-19 PROCEDURE — 81001 URINALYSIS AUTO W/SCOPE: CPT

## 2021-08-19 PROCEDURE — 99281 EMR DPT VST MAYX REQ PHY/QHP: CPT

## 2021-08-19 NOTE — ED NOTES
Pt came to  stating she is leaving due to wait times.  Signed appropriate paperwork     Candi Gonzalez RN  08/19/21 1439

## 2021-09-16 ENCOUNTER — OFFICE VISIT (OUTPATIENT)
Dept: FAMILY MEDICINE CLINIC | Age: 26
End: 2021-09-16

## 2021-09-16 VITALS
WEIGHT: 140.4 LBS | DIASTOLIC BLOOD PRESSURE: 80 MMHG | SYSTOLIC BLOOD PRESSURE: 124 MMHG | HEART RATE: 83 BPM | OXYGEN SATURATION: 95 % | TEMPERATURE: 97 F | BODY MASS INDEX: 21.28 KG/M2 | HEIGHT: 68 IN

## 2021-09-16 DIAGNOSIS — K21.9 GASTROESOPHAGEAL REFLUX DISEASE WITHOUT ESOPHAGITIS: Primary | ICD-10-CM

## 2021-09-16 DIAGNOSIS — F41.9 ANXIETY: ICD-10-CM

## 2021-09-16 DIAGNOSIS — D22.9 CHANGE IN MULTIPLE NEVI: ICD-10-CM

## 2021-09-16 DIAGNOSIS — Z76.89 ENCOUNTER TO ESTABLISH CARE: ICD-10-CM

## 2021-09-16 DIAGNOSIS — R42 DIZZINESS: ICD-10-CM

## 2021-09-16 DIAGNOSIS — Z80.8 FAMILY HISTORY OF SKIN CANCER: ICD-10-CM

## 2021-09-16 DIAGNOSIS — F41.0 PANIC ATTACK: ICD-10-CM

## 2021-09-16 DIAGNOSIS — R30.0 DYSURIA: ICD-10-CM

## 2021-09-16 DIAGNOSIS — N91.2 AMENORRHEA: ICD-10-CM

## 2021-09-16 LAB
A/G RATIO: 1.7 (ref 1.1–2.2)
ALBUMIN SERPL-MCNC: 4.9 G/DL (ref 3.4–5)
ALP BLD-CCNC: 53 U/L (ref 40–129)
ALT SERPL-CCNC: 16 U/L (ref 10–40)
ANION GAP SERPL CALCULATED.3IONS-SCNC: 18 MMOL/L (ref 3–16)
AST SERPL-CCNC: 21 U/L (ref 15–37)
BASOPHILS ABSOLUTE: 0 K/UL (ref 0–0.2)
BASOPHILS RELATIVE PERCENT: 0.3 %
BILIRUB SERPL-MCNC: 0.3 MG/DL (ref 0–1)
BILIRUBIN, POC: NORMAL
BLOOD URINE, POC: NORMAL
BUN BLDV-MCNC: 11 MG/DL (ref 7–20)
CALCIUM SERPL-MCNC: 9.9 MG/DL (ref 8.3–10.6)
CHLORIDE BLD-SCNC: 100 MMOL/L (ref 99–110)
CLARITY, POC: NORMAL
CO2: 19 MMOL/L (ref 21–32)
COLOR, POC: NORMAL
CONTROL: NORMAL
CREAT SERPL-MCNC: 0.7 MG/DL (ref 0.6–1.1)
EOSINOPHILS ABSOLUTE: 0 K/UL (ref 0–0.6)
EOSINOPHILS RELATIVE PERCENT: 0.2 %
GFR AFRICAN AMERICAN: >60
GFR NON-AFRICAN AMERICAN: >60
GLOBULIN: 2.9 G/DL
GLUCOSE BLD-MCNC: 87 MG/DL (ref 70–99)
GLUCOSE URINE, POC: NORMAL
HCT VFR BLD CALC: 41.4 % (ref 36–48)
HEMOGLOBIN: 14.4 G/DL (ref 12–16)
IRON SATURATION: 29 % (ref 15–50)
IRON: 119 UG/DL (ref 37–145)
KETONES, POC: NORMAL
LEUKOCYTE EST, POC: NORMAL
LYMPHOCYTES ABSOLUTE: 1.7 K/UL (ref 1–5.1)
LYMPHOCYTES RELATIVE PERCENT: 19.6 %
MCH RBC QN AUTO: 31.8 PG (ref 26–34)
MCHC RBC AUTO-ENTMCNC: 34.9 G/DL (ref 31–36)
MCV RBC AUTO: 91.3 FL (ref 80–100)
MONOCYTES ABSOLUTE: 0.6 K/UL (ref 0–1.3)
MONOCYTES RELATIVE PERCENT: 6.6 %
NEUTROPHILS ABSOLUTE: 6.5 K/UL (ref 1.7–7.7)
NEUTROPHILS RELATIVE PERCENT: 73.3 %
NITRITE, POC: NORMAL
PDW BLD-RTO: 12.4 % (ref 12.4–15.4)
PH, POC: 6
PLATELET # BLD: 324 K/UL (ref 135–450)
PMV BLD AUTO: 7.8 FL (ref 5–10.5)
POTASSIUM SERPL-SCNC: 4.2 MMOL/L (ref 3.5–5.1)
PREGNANCY TEST URINE, POC: NORMAL
PROTEIN, POC: NORMAL
RBC # BLD: 4.53 M/UL (ref 4–5.2)
SODIUM BLD-SCNC: 137 MMOL/L (ref 136–145)
SPECIFIC GRAVITY, POC: 1.02
TOTAL IRON BINDING CAPACITY: 417 UG/DL (ref 260–445)
TOTAL PROTEIN: 7.8 G/DL (ref 6.4–8.2)
TSH SERPL DL<=0.05 MIU/L-ACNC: 1.77 UIU/ML (ref 0.27–4.2)
UROBILINOGEN, POC: 0.2
WBC # BLD: 8.9 K/UL (ref 4–11)

## 2021-09-16 PROCEDURE — 81025 URINE PREGNANCY TEST: CPT | Performed by: NURSE PRACTITIONER

## 2021-09-16 PROCEDURE — 1036F TOBACCO NON-USER: CPT | Performed by: NURSE PRACTITIONER

## 2021-09-16 PROCEDURE — 81002 URINALYSIS NONAUTO W/O SCOPE: CPT | Performed by: NURSE PRACTITIONER

## 2021-09-16 PROCEDURE — 99204 OFFICE O/P NEW MOD 45 MIN: CPT | Performed by: NURSE PRACTITIONER

## 2021-09-16 PROCEDURE — G8420 CALC BMI NORM PARAMETERS: HCPCS | Performed by: NURSE PRACTITIONER

## 2021-09-16 PROCEDURE — 36415 COLL VENOUS BLD VENIPUNCTURE: CPT | Performed by: NURSE PRACTITIONER

## 2021-09-16 PROCEDURE — G8427 DOCREV CUR MEDS BY ELIG CLIN: HCPCS | Performed by: NURSE PRACTITIONER

## 2021-09-16 PROCEDURE — 84439 ASSAY OF FREE THYROXINE: CPT | Performed by: NURSE PRACTITIONER

## 2021-09-16 RX ORDER — NORGESTIMATE AND ETHINYL ESTRADIOL 0.25-0.035
1 KIT ORAL DAILY
COMMUNITY
End: 2021-09-16 | Stop reason: CLARIF

## 2021-09-16 RX ORDER — OMEPRAZOLE 20 MG/1
20 CAPSULE, DELAYED RELEASE ORAL
Qty: 30 CAPSULE | Refills: 5 | Status: SHIPPED | OUTPATIENT
Start: 2021-09-16

## 2021-09-16 SDOH — ECONOMIC STABILITY: FOOD INSECURITY: WITHIN THE PAST 12 MONTHS, THE FOOD YOU BOUGHT JUST DIDN'T LAST AND YOU DIDN'T HAVE MONEY TO GET MORE.: NEVER TRUE

## 2021-09-16 SDOH — ECONOMIC STABILITY: FOOD INSECURITY: WITHIN THE PAST 12 MONTHS, YOU WORRIED THAT YOUR FOOD WOULD RUN OUT BEFORE YOU GOT MONEY TO BUY MORE.: NEVER TRUE

## 2021-09-16 ASSESSMENT — ENCOUNTER SYMPTOMS
WHEEZING: 0
SORE THROAT: 0
CHEST TIGHTNESS: 0
COUGH: 0
TROUBLE SWALLOWING: 0
SHORTNESS OF BREATH: 0

## 2021-09-16 ASSESSMENT — SOCIAL DETERMINANTS OF HEALTH (SDOH): HOW HARD IS IT FOR YOU TO PAY FOR THE VERY BASICS LIKE FOOD, HOUSING, MEDICAL CARE, AND HEATING?: NOT HARD AT ALL

## 2021-09-16 NOTE — PROGRESS NOTES
Traci Charles Rodríguez  1995  27 y.o. SUBJECT GEETA:    Chief Complaint   Patient presents with   Trish Perez is a 27year old female who is in to establish care. She is accompanied by her spouse. She states her main concern today is the panic and anxiety she has been experiencing for the last 6 months. She states she had the Covid 19 vaccine and one month following the second shot she had an episode of panic. She states \"I know what my usual panic attack is like but this was different\". She states since the first episode of palpitations she has had two others. She denies chest pain, shortness of breath or fainting. She gives a past history of having panic attacks and had counseling while in high school. She is  and has three daughters. She states she is on contraceptive and is about one week late with menses. She denies missing any of her pills. She states last month she had two periods. She also reports concern with two mole color changes. She also complains of reflux, has used Pepcid with some relief. She states her reflux causes nausea and discomfort in her upper abdomen. She denies evidence of bleeding through stool. She states she had one episode of vomiting, no blood. Family Hx  She states her mother just completed chemo for skin cancer. She denies history of diabetes, thyroid issues, heart disease or high blood pressure. Current Outpatient Medications on File Prior to Visit   Medication Sig Dispense Refill    Norgestimate-Eth Estradiol (MONONESSA PO) Take by mouth       No current facility-administered medications on file prior to visit. Past Medical History:   Diagnosis Date    Anxiety     UTI (urinary tract infection)      History reviewed. No pertinent surgical history.   Family History   Problem Relation Age of Onset   [facility] Cancer Mother     Diabetes Mother     Other Mother     Cancer Paternal Grandmother      Social History     Socioeconomic History    Marital status:      Spouse name: Not on file    Number of children: Not on file    Years of education: Not on file    Highest education level: Not on file   Occupational History    Not on file   Tobacco Use    Smoking status: Former Smoker    Smokeless tobacco: Never Used   Vaping Use    Vaping Use: Some days   Substance and Sexual Activity    Alcohol use: No     Comment: occasionally    Drug use: Yes     Types: Marijuana     Comment: everyday    Sexual activity: Yes   Other Topics Concern    Not on file   Social History Narrative    Not on file     Social Determinants of Health     Financial Resource Strain: Low Risk     Difficulty of Paying Living Expenses: Not hard at all   Food Insecurity: No Food Insecurity    Worried About [address] [employer] in the Last Year: Never true    [address] in the Last Year: Never true   Transportation Needs:     Lack of Transportation (Medical):  Lack of Transportation (Non-Medical):    Physical Activity:     Days of Exercise per Week:     Minutes of Exercise per Session:    Stress:     Feeling of Stress :    Social Connections:     Frequency of Communication with Friends and Family:     Frequency of Social Gatherings with Friends and Family:     Attends Christian Services:     Active Member of Clubs or Organizations:     Attends Club or Organization Meetings:     Marital Status:    Intimate Partner Violence:     Fear of Current or Ex-Partner:     Emotionally Abused:     Physically Abused:     Sexually Abused:        Review of Systems   Constitutional: Positive for appetite change and unexpected weight change. Negative for activity change, chills, diaphoresis, fatigue and fever. HENT: Negative for sore throat and trouble swallowing. Respiratory: Negative for cough, chest tightness, shortness of breath and wheezing. Cardiovascular: Negative for chest pain, palpitations and leg swelling.    Gastrointestinal: Positive for abdominal pain, nausea and vomiting. Negative for blood in stool, constipation and diarrhea. Genitourinary: Positive for dysuria. Skin: Positive for color change (two moles on legs). Neurological: Positive for dizziness. Negative for seizures, syncope, weakness, light-headedness and headaches. Psychiatric/Behavioral: Positive for dysphoric mood and sleep disturbance. OBJECTIVE:     /80 (Site: Right Upper Arm, Position: Sitting, Cuff Size: Medium Adult)   Pulse 83   Temp 97 °F (36.1 °C)   Ht 5' 8\" (1.727 m)   Wt 140 lb 6.4 oz (63.7 kg)   SpO2 95%   BMI 21.35 kg/m²     Physical Exam  Vitals reviewed. Constitutional:       General: She is not in acute distress. Appearance: Normal appearance. She is well-developed. She is not ill-appearing or diaphoretic. HENT:      Head: Normocephalic and atraumatic. Right Ear: External ear normal.      Left Ear: External ear normal.      Nose: Nose normal.   Eyes:      General: No scleral icterus. Conjunctiva/sclera: Conjunctivae normal.      Pupils: Pupils are equal, round, and reactive to light. Cardiovascular:      Rate and Rhythm: Normal rate and regular rhythm. Heart sounds: Normal heart sounds. No murmur heard. No friction rub. No gallop. Pulmonary:      Effort: Pulmonary effort is normal. No respiratory distress. Breath sounds: Normal breath sounds. No wheezing. Chest:      Chest wall: No tenderness. Abdominal:      General: Abdomen is flat. Bowel sounds are normal. There is no distension. Palpations: Abdomen is soft. There is no mass. Tenderness: There is abdominal tenderness. There is no right CVA tenderness or left CVA tenderness. Musculoskeletal:         General: Normal range of motion. Cervical back: Normal range of motion and neck supple. No rigidity or tenderness. Right lower leg: No edema. Left lower leg: No edema. Lymphadenopathy:      Cervical: No cervical adenopathy. Skin:     General: Skin is warm and dry. Comments: Dark colored mole, left inner leg; light colored mole, inner right thigh. Neurological:      Mental Status: She is alert and oriented to person, place, and time. Psychiatric:         Behavior: Behavior normal.         Thought Content:  Thought content normal.         Judgment: Judgment normal.         Results in Past 30 Days  Result Component Current Result Ref Range Previous Result Ref Range   Albumin/Globulin Ratio 1.7 (9/16/2021) 1.1 - 2.2 Not in Time Range    Albumin 4.9 (9/16/2021) 3.4 - 5.0 g/dL Not in Time Range    Alkaline Phosphatase 53 (9/16/2021) 40 - 129 U/L Not in Time Range    ALT 16 (9/16/2021) 10 - 40 U/L Not in Time Range    AST 21 (9/16/2021) 15 - 37 U/L Not in Time Range    BUN 11 (9/16/2021) 7 - 20 mg/dL Not in Time Range    Calcium 9.9 (9/16/2021) 8.3 - 10.6 mg/dL Not in Time Range    Chloride 100 (9/16/2021) 99 - 110 mmol/L Not in Time Range    CO2 19 (L) (9/16/2021) 21 - 32 mmol/L Not in Time Range    CREATININE 0.7 (9/16/2021) 0.6 - 1.1 mg/dL Not in Time Range    GFR  >60 (9/16/2021) >60 Not in Time Range    GFR Non- >60 (9/16/2021) >60 Not in Time Range    Globulin 2.9 (9/16/2021) g/dL Not in Time Range    Glucose 87 (9/16/2021) 70 - 99 mg/dL Not in Time Range    Potassium 4.2 (9/16/2021) 3.5 - 5.1 mmol/L Not in Time Range    Sodium 137 (9/16/2021) 136 - 145 mmol/L Not in Time Range    Total Bilirubin 0.3 (9/16/2021) 0.0 - 1.0 mg/dL Not in Time Range    Total Protein 7.8 (9/16/2021) 6.4 - 8.2 g/dL Not in Time Range      No results found for: LABA1C, LABMICR, LDLCALC    Lab Results   Component Value Date    WBC 8.9 09/16/2021    WBC 6.9 08/24/2019    WBC 8.0 09/30/2018    NEUTROABS 6.5 09/16/2021    HGB 14.4 09/16/2021    HGB 12.9 08/24/2019    HGB 10.5 09/30/2018    HCT 41.4 09/16/2021    HCT 39.2 08/24/2019    HCT 32.2 09/30/2018    MCV 91.3 09/16/2021    MCV 96.3 08/24/2019    MCV 93.3 09/30/2018     09/16/2021     08/24/2019     09/30/2018    SEGSABS 3.9 08/24/2019    SEGSABS 6.8 07/01/2018    SEGSABS 6.4 05/01/2018    LYMPHSABS 1.7 09/16/2021    MONOSABS 0.6 09/16/2021    EOSABS 0.0 09/16/2021    BASOSABS 0.0 09/16/2021     Lab Results   Component Value Date    TSH 1.77 09/16/2021     Lab Results   Component Value Date    LABALBU 4.9 09/16/2021    BILITOT 0.3 09/16/2021    AST 21 09/16/2021    ALT 16 09/16/2021    ALKPHOS 53 09/16/2021             Results for orders placed or performed in visit on 09/16/21   CBC Auto Differential   Result Value Ref Range    WBC 8.9 4.0 - 11.0 K/uL    RBC 4.53 4.00 - 5.20 M/uL    Hemoglobin 14.4 12.0 - 16.0 g/dL    Hematocrit 41.4 36.0 - 48.0 %    MCV 91.3 80.0 - 100.0 fL    MCH 31.8 26.0 - 34.0 pg    MCHC 34.9 31.0 - 36.0 g/dL    RDW 12.4 12.4 - 15.4 %    Platelets 987 367 - 115 K/uL    MPV 7.8 5.0 - 10.5 fL    Neutrophils % 73.3 %    Lymphocytes % 19.6 %    Monocytes % 6.6 %    Eosinophils % 0.2 %    Basophils % 0.3 %    Neutrophils Absolute 6.5 1.7 - 7.7 K/uL    Lymphocytes Absolute 1.7 1.0 - 5.1 K/uL    Monocytes Absolute 0.6 0.0 - 1.3 K/uL    Eosinophils Absolute 0.0 0.0 - 0.6 K/uL    Basophils Absolute 0.0 0.0 - 0.2 K/uL   Comprehensive Metabolic Panel   Result Value Ref Range    Sodium 137 136 - 145 mmol/L    Potassium 4.2 3.5 - 5.1 mmol/L    Chloride 100 99 - 110 mmol/L    CO2 19 (L) 21 - 32 mmol/L    Anion Gap 18 (H) 3 - 16    Glucose 87 70 - 99 mg/dL    BUN 11 7 - 20 mg/dL    CREATININE 0.7 0.6 - 1.1 mg/dL    GFR Non-African American >60 >60    GFR African American >60 >60    Calcium 9.9 8.3 - 10.6 mg/dL    Total Protein 7.8 6.4 - 8.2 g/dL    Albumin 4.9 3.4 - 5.0 g/dL    Albumin/Globulin Ratio 1.7 1.1 - 2.2    Total Bilirubin 0.3 0.0 - 1.0 mg/dL    Alkaline Phosphatase 53 40 - 129 U/L    ALT 16 10 - 40 U/L    AST 21 15 - 37 U/L    Globulin 2.9 g/dL   TSH without Reflex   Result Value Ref Range    TSH 1.77 0.27 - 4.20 uIU/mL   Iron and TIBC   Result Value Ref Range    Iron 119 37 - 145 ug/dL    TIBC 417 260 - 445 ug/dL    Iron Saturation 29 15 - 50 %   POCT Urinalysis no Micro   Result Value Ref Range    Color, UA      Clarity, UA      Glucose, UA POC neg     Bilirubin, UA neg     Ketones, UA neg     Spec Grav, UA 1.025     Blood, UA POC neg     pH, UA 6.0     Protein, UA POC neg     Urobilinogen, UA 0.2     Leukocytes, UA neg     Nitrite, UA neg    POCT urine pregnancy   Result Value Ref Range    Preg Test, Ur neg     Control         ASSESSMENT AND PLAN:     1. Encounter to establish care    2. Gastroesophageal reflux disease without esophagitis  - CBC Auto Differential  - Iron and TIBC  - omeprazole (PRILOSEC) 20 MG delayed release capsule; Take 1 capsule by mouth every morning (before breakfast)  Dispense: 30 capsule; Refill: 5    3. Anxiety  - Comprehensive Metabolic Panel  - TSH without Reflex  - FREE T4  - Amb External Referral To Psychology    4. Panic attack  - Comprehensive Metabolic Panel  - TSH without Reflex  - FREE T4  - Amb External Referral To Psychology    5. Dizziness  - Iron and TIBC    6. Dysuria  - POCT Urinalysis no Micro    7. Amenorrhea  - POCT urine pregnancy    8. Change in multiple nevi  - Amb External Referral To Dermatology    9. Family history of skin cancer  - Amb External Referral To Dermatology    Fluids, rest  Will call results of labs to you  Referred for counseling  Referred to dermatology for evaluation and treatment of skin changes  Take prescribed medication as directed  Verbalized understanding and agreement with plan    Return in about 3 months (around 12/16/2021) for anxiety, GERD. Care discussed with patient. Patient educated on signs and symptoms of exacerbation and when to seek further medical attention. Advised to call for any problems, questions, or concerns. Patient verbalizes understanding and agrees with plan. Medications reviewed and reconciled. Continue current medications.   Appropriate prescriptions are ordered. Risks and benefits of meds are discussed. After visit summary provided.

## 2021-09-17 ASSESSMENT — PATIENT HEALTH QUESTIONNAIRE - PHQ9
10. IF YOU CHECKED OFF ANY PROBLEMS, HOW DIFFICULT HAVE THESE PROBLEMS MADE IT FOR YOU TO DO YOUR WORK, TAKE CARE OF THINGS AT HOME, OR GET ALONG WITH OTHER PEOPLE: 2
SUM OF ALL RESPONSES TO PHQ QUESTIONS 1-9: 11
8. MOVING OR SPEAKING SO SLOWLY THAT OTHER PEOPLE COULD HAVE NOTICED. OR THE OPPOSITE, BEING SO FIGETY OR RESTLESS THAT YOU HAVE BEEN MOVING AROUND A LOT MORE THAN USUAL: 1
SUM OF ALL RESPONSES TO PHQ QUESTIONS 1-9: 11
3. TROUBLE FALLING OR STAYING ASLEEP: 1
SUM OF ALL RESPONSES TO PHQ QUESTIONS 1-9: 11
SUM OF ALL RESPONSES TO PHQ9 QUESTIONS 1 & 2: 3
1. LITTLE INTEREST OR PLEASURE IN DOING THINGS: 1
4. FEELING TIRED OR HAVING LITTLE ENERGY: 1
6. FEELING BAD ABOUT YOURSELF - OR THAT YOU ARE A FAILURE OR HAVE LET YOURSELF OR YOUR FAMILY DOWN: 2
2. FEELING DOWN, DEPRESSED OR HOPELESS: 2
7. TROUBLE CONCENTRATING ON THINGS, SUCH AS READING THE NEWSPAPER OR WATCHING TELEVISION: 1
9. THOUGHTS THAT YOU WOULD BE BETTER OFF DEAD, OR OF HURTING YOURSELF: 0
5. POOR APPETITE OR OVEREATING: 2

## 2021-09-17 ASSESSMENT — ENCOUNTER SYMPTOMS
BLOOD IN STOOL: 0
CONSTIPATION: 0
VOMITING: 1
DIARRHEA: 0
COLOR CHANGE: 1
ABDOMINAL PAIN: 1
NAUSEA: 1

## 2021-09-21 ENCOUNTER — TELEPHONE (OUTPATIENT)
Dept: FAMILY MEDICINE CLINIC | Age: 26
End: 2021-09-21

## 2021-09-21 NOTE — TELEPHONE ENCOUNTER
Patient called regarding lab result. Advised that all lab studies were reported within normal range. States she has not received a call from counseling. Advised to call us back if no call by the end of the week. Verbalized understanding and agreement with plan.

## 2021-09-28 ENCOUNTER — OFFICE VISIT (OUTPATIENT)
Dept: OBGYN | Age: 26
End: 2021-09-28
Payer: MEDICARE

## 2021-09-28 VITALS
BODY MASS INDEX: 21.52 KG/M2 | WEIGHT: 142 LBS | HEIGHT: 68 IN | SYSTOLIC BLOOD PRESSURE: 119 MMHG | DIASTOLIC BLOOD PRESSURE: 80 MMHG

## 2021-09-28 DIAGNOSIS — N94.9 VAGINAL BURNING: Primary | ICD-10-CM

## 2021-09-28 DIAGNOSIS — N89.8 VAGINAL ITCHING: ICD-10-CM

## 2021-09-28 PROCEDURE — 99213 OFFICE O/P EST LOW 20 MIN: CPT | Performed by: NURSE PRACTITIONER

## 2021-09-28 RX ORDER — METRONIDAZOLE 500 MG/1
500 TABLET ORAL 2 TIMES DAILY
Qty: 14 TABLET | Refills: 0 | Status: SHIPPED | OUTPATIENT
Start: 2021-09-28

## 2021-09-28 RX ORDER — FLUCONAZOLE 150 MG/1
150 TABLET ORAL ONCE
Qty: 2 TABLET | Refills: 0 | Status: SHIPPED | OUTPATIENT
Start: 2021-09-28 | End: 2021-09-28

## 2021-09-28 ASSESSMENT — ENCOUNTER SYMPTOMS
RESPIRATORY NEGATIVE: 1
GASTROINTESTINAL NEGATIVE: 1

## 2021-09-28 NOTE — PROGRESS NOTES
9/28/21    Nalini Rodríguez  1995    Chief Complaint   Patient presents with    Vaginal Itching     Pt used scented tampon, since then c/o vaginal itching, burning, and irritation interal and external started 9/24/2021         Eunice Pascual is a 27 y.o. female who presents today for vaginal discharge    She describes the discharge as white and chunky. The symptoms have lasted for 4 days. Associated signs and symptoms include itching/burning. Past Medical History:   Diagnosis Date    Anxiety     UTI (urinary tract infection)        No past surgical history on file. Social History     Tobacco Use    Smoking status: Former Smoker    Smokeless tobacco: Never Used   Vaping Use    Vaping Use: Some days   Substance Use Topics    Alcohol use: No     Comment: occasionally    Drug use: Yes     Types: Marijuana     Comment: everyday       Family History   Problem Relation Age of Onset   [employer] Cancer Mother     Diabetes Mother     Other Mother     Cancer Paternal Grandmother        Current Outpatient Medications   Medication Sig Dispense Refill    fluconazole (DIFLUCAN) 150 MG tablet Take 1 tablet by mouth once for 1 dose Take one tablet PO now, repeat in 3 days if symptoms persist 2 tablet 0    metroNIDAZOLE (FLAGYL) 500 MG tablet Take 1 tablet by mouth 2 times daily 14 tablet 0    omeprazole (PRILOSEC) 20 MG delayed release capsule Take 1 capsule by mouth every morning (before breakfast) 30 capsule 5    Norgestimate-Eth Estradiol (MONONESSA PO) Take by mouth       No current facility-administered medications for this visit. No Known Allergies    [id number]    Immunization History   Administered Date(s) Administered    COVID-19, Pfizer, PF, 30mcg/0.3mL 03/24/2021, 04/14/2021    Influenza, Quadv, 6 mo and older, IM, PF (Flulaval, Fluarix) 10/02/2018    Tdap (Boostrix, Adacel) 10/02/2018       Review of Systems   Constitutional: Negative. Respiratory: Negative. Gastrointestinal: Negative. Genitourinary: Positive for vaginal discharge. /80   Ht 5' 8\" (1.727 m)   Wt 142 lb (64.4 kg)   LMP 09/23/2021   BMI 21.59 kg/m²     Physical Exam    No results found for this visit on 09/28/21. ASSESSMENT AND PLAN   Diagnosis Orders   1. Vaginal burning  Vaginal Pathogens Probes *A   2. Vaginal itching  Vaginal Pathogens Probes *A     Vaginal hygiene reviewed  Return if symptoms worsen or fail to improve.     Dashawn Briggs, APRN - CNP

## 2021-09-29 LAB
CANDIDA SPECIES, DNA PROBE: ABNORMAL
GARDNERELLA VAGINALIS, DNA PROBE: ABNORMAL
TRICHOMONAS VAGINALIS DNA: ABNORMAL

## 2021-10-03 RX ORDER — FLUCONAZOLE 150 MG/1
150 TABLET ORAL ONCE
Qty: 1 TABLET | Refills: 0 | Status: SHIPPED | OUTPATIENT
Start: 2021-10-03 | End: 2021-10-03

## 2021-10-13 ENCOUNTER — TELEPHONE (OUTPATIENT)
Dept: FAMILY MEDICINE CLINIC | Age: 26
End: 2021-10-13

## 2021-10-13 NOTE — TELEPHONE ENCOUNTER
Please let Lucy Akhtar know a couple of the blood tests that were drawn would be part of a work up for myocarditis. I can refer to cardiology for further testing if she is agreeable, especially if she is having rapid heart rates or palpitations. Let me know.

## 2021-10-13 NOTE — TELEPHONE ENCOUNTER
Luiz Coats states she had labs in Sept 2021 and now would like to know if she should be tested for Myocarditis due to rapid heart rates. Luiz Coats said she googled her symptoms and found this is consistent for Myocarditis. Luiz Coats voiced understanding I will notify with Courtney Hopper. Note routed to HENNA Segal.

## 2021-10-13 NOTE — TELEPHONE ENCOUNTER
----- Message from Cleopatra Dutta sent at 10/12/2021  5:28 PM EDT -----  Subject: Message to Provider    QUESTIONS  Information for Provider? Patient of Michelle Almodovar CNP is requesting a   callback from her regarding some blood work done, please call patient.  ---------------------------------------------------------------------------  --------------  [address]  What is the best way for the office to contact you? OK to leave message on   voicemail  Preferred Call Back Phone Number? [phone]  ---------------------------------------------------------------------------  --------------  SCRIPT ANSWERS  Relationship to Patient?  Self

## 2021-10-14 NOTE — TELEPHONE ENCOUNTER
Sera Melendez informed of lab testing and offered Cardiology referral which she declines. HENNA Quintana informed.

## 2021-10-25 ENCOUNTER — TELEPHONE (OUTPATIENT)
Dept: OBGYN | Age: 26
End: 2021-10-25

## 2021-10-25 RX ORDER — METRONIDAZOLE 7.5 MG/G
GEL VAGINAL
Qty: 1 EACH | Refills: 0 | Status: SHIPPED | OUTPATIENT
Start: 2021-10-25

## 2021-10-25 NOTE — TELEPHONE ENCOUNTER
Pt was given the flagyl for bv, stopped taking it and wants the cream-easier and doesn't upset her stomach

## 2021-11-24 ENCOUNTER — TELEPHONE (OUTPATIENT)
Dept: OBGYN | Age: 26
End: 2021-11-24

## 2022-10-12 ENCOUNTER — HOSPITAL ENCOUNTER (EMERGENCY)
Age: 27
Discharge: HOME | End: 2022-10-12
Attending: EMERGENCY MEDICINE
Payer: MEDICARE

## 2022-10-12 VITALS
BODY MASS INDEX: 21.48 KG/M2 | OXYGEN SATURATION: 100 % | DIASTOLIC BLOOD PRESSURE: 82 MMHG | WEIGHT: 145 LBS | TEMPERATURE: 98.1 F | RESPIRATION RATE: 16 BRPM | HEART RATE: 91 BPM | HEIGHT: 69 IN | SYSTOLIC BLOOD PRESSURE: 115 MMHG

## 2022-10-12 DIAGNOSIS — H10.9 CONJUNCTIVITIS OF BOTH EYES, UNSPECIFIED CONJUNCTIVITIS TYPE: Primary | ICD-10-CM

## 2022-10-12 PROCEDURE — 6370000000 HC RX 637 (ALT 250 FOR IP): Performed by: EMERGENCY MEDICINE

## 2022-10-12 PROCEDURE — 99283 EMERGENCY DEPT VISIT LOW MDM: CPT

## 2022-10-12 RX ORDER — CARBOXYMETHYLCELLULOSE SODIUM 2.5 MG/ML
1 SOLUTION/ DROPS OPHTHALMIC PRN
Qty: 15 ML | Refills: 1 | Status: SHIPPED | OUTPATIENT
Start: 2022-10-12

## 2022-10-12 RX ADMIN — FLUORESCEIN SODIUM 1 MG: 1 STRIP OPHTHALMIC at 07:07

## 2022-10-12 ASSESSMENT — PAIN - FUNCTIONAL ASSESSMENT: PAIN_FUNCTIONAL_ASSESSMENT: 0-10

## 2022-10-12 ASSESSMENT — PAIN SCALES - GENERAL: PAINLEVEL_OUTOF10: 4

## 2022-10-12 NOTE — ED PROVIDER NOTES
Emergency Department Encounter  Location: [address] [place] [address]    Patient: Laura Valenzuela  MRN: [id number]  : 1995  Date of evaluation: 10/12/2022  ED Provider: [address], DO    Chief Complaint:    Eye Pain (States that she has recently been treated for pink eye and it has not improved)    [place]:  Laura Valenzuela is a 32 y.o. female that presents to the emergency department with concern for bilateral eye pain and irritation. Patient states it started nearly 3 weeks ago. She was initially seen in urgent care and given Polytrim drops with resolution of her symptoms. She states in that same timeframe, 2 of her children developed similar symptoms. She initially felt better for several days but the irritation started again at which time she restarted the eyedrops. States that now every time she puts them in, her eyes burn. She has continued to have bilateral discharge and crusting around the eyes. States her vision is blurry when she puts drops in. No other vision changes. Does not wear corrective lenses of any kind. No fever or chills. Describes a headache behind both eyes. ROS:  At least 6 systems reviewed and are acutely negative unless otherwise noted in the HPI. Past Medical History:   Diagnosis Date    Anxiety     UTI (urinary tract infection)      History reviewed. No pertinent surgical history.   Family History   Problem Relation Age of Onset    Cancer Mother     Diabetes Mother     Other Mother     Cancer Paternal Grandmother      Social History     Socioeconomic History    Marital status:      Spouse name: Not on file    Number of children: Not on file    Years of education: Not on file    Highest education level: Not on file   Occupational History    Not on file   Tobacco Use    Smoking status: Former    Smokeless tobacco: Never   Vaping Use    Vaping Use: Some days   Substance and Sexual Activity    Alcohol use: No     Comment: occasionally    Drug use: Yes     Types: Marijuana Gauri Oliver     Comment: everyday    Sexual activity: Yes   Other Topics Concern    Not on file   Social History Narrative    Not on file     Social Determinants of Health     Financial Resource Strain: Not on file   Food Insecurity: Not on file   Transportation Needs: Not on file   Physical Activity: Not on file   Stress: Not on file   Social Connections: Not on file   Intimate Partner Violence: Not on file   Housing Stability: Not on file     No current facility-administered medications for this encounter. Current Outpatient Medications   Medication Sig Dispense Refill    Carboxymethylcellulose Sodium (THERATEARS) 0.25 % SOLN Apply 1 drop to eye as needed (EYE IRRITATION) 15 mL 1    metroNIDAZOLE (METROGEL) 0.75 % vaginal gel Insert 1 applicator full (47.5RT) by vaginal route daily at bedtime for 5 nights 1 each 0    metroNIDAZOLE (FLAGYL) 500 MG tablet Take 1 tablet by mouth 2 times daily 14 tablet 0    omeprazole (PRILOSEC) 20 MG delayed release capsule Take 1 capsule by mouth every morning (before breakfast) 30 capsule 5    Norgestimate-Eth Estradiol (MONONESSA PO) Take by mouth       No Known Allergies    Nursing Notes Reviewed    Physical Exam:  ED Triage Vitals [10/12/22 0607]   Enc Vitals Group      /82      Heart Rate 91      Resp 16      Temp 98.1 °F (36.7 °C)      Temp Source Oral      SpO2 100 %      Weight 145 lb (65.8 kg)      Height 5' 9\" (1.753 m)      Head Circumference       Peak Flow       Pain Score       Pain Loc       Pain Edu? Excl. in [address]? GENERAL APPEARANCE: Awake and alert. Cooperative. No acute distress. HEAD: Normocephalic. Atraumatic. EYES: EOM's grossly intact. No pain reported with extraocular movements. Sclera anicteric. PERRLA. Patient with mild conjunctival injection. Scant dried discharge around both eyes. No focal intake of fluorescein with Woods lamp. ENT: Tolerates saliva. No trismus. NECK: Supple. Trachea midline. CARDIO: RRR. .   LUNGS: Respirations unlabored. EXTREMITIES: No acute deformities. SKIN: Warm and dry. NEUROLOGICAL: No gross facial drooping. Moves all 4 extremities spontaneously. Gait is steady and comfortable. ED Course and MDM:  Patient without vision changes. No focal intake to suggest concurrent abrasion or developing ulcer. She is instructed to discontinue the Polytrim drops. Instructed to use lubricant drops as needed. I think patient is appropriate for outpatient management. Patient is given instructions regarding symptomatic care at home as well as return precautions. To call ophtho for follow up in 1-2 days. Patient verbalizes understanding of all instructions and is comfortable with the plan of care. Final Impression:  1.  Conjunctivitis of both eyes, unspecified conjunctivitis type      DISPOSITION Decision To Discharge 10/12/2022 06:52:06 AM      Patient referred to:  Graham Singleton MD  [address] [facility]  [phone]    Call in 1 day      Beatris ChaudharyHENNA - CNP  [address]  [phone]          [facility] [facility]  [place] [id number] [phone]    If symptoms worsen    Discharge medications:  Discharge Medication List as of 10/12/2022  7:07 AM        START taking these medications    Details   Carboxymethylcellulose Sodium (THERATEARS) 0.25 % SOLN Apply 1 drop to eye as needed (EYE IRRITATION), Disp-15 mL, R-1Normal           (Please note that portions of this note may have been completed with a voice recognition program. Efforts were made to edit the dictations but occasionally words are mis-transcribed.)    Niraj Lugo,   10/12/22 8731

## 2022-11-01 ENCOUNTER — HOSPITAL ENCOUNTER (OUTPATIENT)
Age: 27
Setting detail: SPECIMEN
Discharge: HOME | End: 2022-11-01
Payer: MEDICAID

## 2022-11-01 ENCOUNTER — OFFICE VISIT (OUTPATIENT)
Dept: OBGYN | Age: 27
End: 2022-11-01
Payer: MEDICAID

## 2022-11-01 VITALS
DIASTOLIC BLOOD PRESSURE: 79 MMHG | BODY MASS INDEX: 23.64 KG/M2 | HEIGHT: 68 IN | WEIGHT: 156 LBS | SYSTOLIC BLOOD PRESSURE: 113 MMHG

## 2022-11-01 DIAGNOSIS — Z01.419 WOMEN'S ANNUAL ROUTINE GYNECOLOGICAL EXAMINATION: Primary | ICD-10-CM

## 2022-11-01 DIAGNOSIS — N64.4 BREAST TENDERNESS: ICD-10-CM

## 2022-11-01 DIAGNOSIS — R10.2 PELVIC PAIN: ICD-10-CM

## 2022-11-01 DIAGNOSIS — N63.11 MASS OF UPPER OUTER QUADRANT OF RIGHT BREAST: ICD-10-CM

## 2022-11-01 LAB
BILIRUBIN, POC: NORMAL
BLOOD URINE, POC: NORMAL
CLARITY, POC: NORMAL
COLOR, POC: NORMAL
CONTROL: NORMAL
GLUCOSE URINE, POC: NORMAL
KETONES, POC: NORMAL
LEUKOCYTE EST, POC: NORMAL
NITRITE, POC: NORMAL
PH, POC: 7
PREGNANCY TEST URINE, POC: NEGATIVE
PROTEIN, POC: NORMAL
SPECIFIC GRAVITY, POC: NORMAL
UROBILINOGEN, POC: NORMAL

## 2022-11-01 PROCEDURE — 81025 URINE PREGNANCY TEST: CPT

## 2022-11-01 PROCEDURE — G8484 FLU IMMUNIZE NO ADMIN: HCPCS

## 2022-11-01 PROCEDURE — 87801 DETECT AGNT MULT DNA AMPLI: CPT

## 2022-11-01 PROCEDURE — 81002 URINALYSIS NONAUTO W/O SCOPE: CPT

## 2022-11-01 PROCEDURE — 99395 PREV VISIT EST AGE 18-39: CPT

## 2022-11-01 PROCEDURE — 88142 CYTOPATH C/V THIN LAYER: CPT

## 2022-11-01 ASSESSMENT — PATIENT HEALTH QUESTIONNAIRE - PHQ9
2. FEELING DOWN, DEPRESSED OR HOPELESS: 0
1. LITTLE INTEREST OR PLEASURE IN DOING THINGS: 0
SUM OF ALL RESPONSES TO PHQ9 QUESTIONS 1 & 2: 0
SUM OF ALL RESPONSES TO PHQ QUESTIONS 1-9: 0

## 2022-11-01 ASSESSMENT — ENCOUNTER SYMPTOMS
RESPIRATORY NEGATIVE: 1
ABDOMINAL PAIN: 0
GASTROINTESTINAL NEGATIVE: 1

## 2022-11-01 NOTE — PROGRESS NOTES
11/1/22    Nalini Rodríguez  1995    Chief Complaint   Patient presents with    Annual Exam     Pt here for annual exam. Last pap 2019- normal. LMP 10/18/22. Pt currently sexually active. - preg test. Pt c/o both breast being sensitive. Pt states she has hx of breast cancer, both sides. Pt also c/o R side ovary pain after intercourse. The patient is a 32 y.o. female, [id number] who presents for her annual exam.  She is menstruating normally. She is  sexually active. She is not currently taking birth control    She reports additional symptoms of breast lump, breast pain, and occasional R sided pain following intercourse . Pt states R breast lump and pain has been present for over a year, discussed at her last annual exam and breast ultrasound order was given but pt reports being too anxious to have exam completed. Pt states R-sided pain following intercourse has only happened once or twice, states it is not bothersome at this time and denies vaginal discharge. Pap smear history: Her last PAP smear was in 2019. Her results were normal.    Past Medical History:   Diagnosis Date    Abnormal uterine bleeding (AUB)     Anxiety     Breast pain     History of miscarriage     History of sexually transmitted disease     Irregular menses     UTI (urinary tract infection)        No past surgical history on file.     Family History   Problem Relation Age of Onset    Cancer Mother     Diabetes Mother     Other Mother     Cancer Paternal Grandmother        Social History     Tobacco Use    Smoking status: Former    Smokeless tobacco: Never   Vaping Use    Vaping Use: Some days   Substance Use Topics    Alcohol use: No     Comment: occasionally    Drug use: Yes     Types: Marijuana Beena Palm)     Comment: everyday       Current Outpatient Medications   Medication Sig Dispense Refill    Carboxymethylcellulose Sodium (THERATEARS) 0.25 % SOLN Apply 1 drop to eye as needed (EYE IRRITATION) (Patient not taking: Reported on 2022) 15 mL 1    metroNIDAZOLE (METROGEL) 0.75 % vaginal gel Insert 1 applicator full (64.2QE) by vaginal route daily at bedtime for 5 nights (Patient not taking: Reported on 2022) 1 each 0    metroNIDAZOLE (FLAGYL) 500 MG tablet Take 1 tablet by mouth 2 times daily (Patient not taking: Reported on 2022) 14 tablet 0    omeprazole (PRILOSEC) 20 MG delayed release capsule Take 1 capsule by mouth every morning (before breakfast) (Patient not taking: Reported on 2022) 30 capsule 5    Norgestimate-Eth Estradiol (MONONESSA PO) Take by mouth (Patient not taking: Reported on 2022)       No current facility-administered medications for this visit. No Known Allergies        Immunization History   Administered Date(s) Administered    COVID-19, PFIZER PURPLE top, DILUTE for use, (age 15 y+), 30mcg/0.3mL 2021, 2021    Influenza, Quadv, 6 mo and older, IM, PF (Flulaval, Fluarix) 10/02/2018    Tdap (Boostrix, Adacel) 10/02/2018       Review of Systems   Constitutional: Negative. Negative for fatigue and fever. Respiratory: Negative. Gastrointestinal: Negative. Negative for abdominal pain. Endocrine: Negative. Genitourinary:  Positive for pelvic pain. Negative for dysuria, frequency, menstrual problem, vaginal bleeding, vaginal discharge and vaginal pain. Musculoskeletal: Negative. Skin: Negative. Neurological: Negative. Negative for dizziness and headaches. Psychiatric/Behavioral: Negative. /79   Ht 5' 8\" (1.727 m)   Wt 156 lb (70.8 kg)   LMP 10/18/2022   BMI 23.72 kg/m²     Physical Exam  Vitals and nursing note reviewed. Constitutional:       General: She is not in acute distress. Appearance: Normal appearance. She is normal weight. HENT:      Head: Normocephalic and atraumatic. Pulmonary:      Effort: Pulmonary effort is normal. No respiratory distress.    Chest:   Breasts:     Right: Mass and tenderness (freely mobile, tender mass as specified above) present. Left: Normal. No mass or tenderness. Abdominal:      Palpations: Abdomen is soft. Tenderness: There is no abdominal tenderness. Genitourinary:     General: Normal vulva. Exam position: Lithotomy position. Vagina: Normal.      Cervix: Normal.      Uterus: Normal.       Adnexa: Right adnexa normal and left adnexa normal.   Musculoskeletal:         General: Normal range of motion. Cervical back: Normal range of motion. Lymphadenopathy:      Upper Body:      Right upper body: No supraclavicular or axillary adenopathy. Left upper body: No supraclavicular or axillary adenopathy. Skin:     General: Skin is warm and dry. Findings: No rash. Neurological:      General: No focal deficit present. Mental Status: She is alert and oriented to person, place, and time. Psychiatric:         Mood and Affect: Mood normal.         Behavior: Behavior normal.         Thought Content: Thought content normal.       Results for orders placed or performed in visit on 11/01/22   POCT urine pregnancy   Result Value Ref Range    Preg Test, Ur Negative     Control     POCT Urinalysis no Micro   Result Value Ref Range    Color, UA      Clarity, UA      Glucose, UA POC neg     Bilirubin, UA      Ketones, UA      Spec Grav, UA      Blood, UA POC neg     pH, UA 7.0     Protein, UA POC neg     Urobilinogen, UA      Leukocytes, UA neg     Nitrite, UA neg        Assessment and Plan   Diagnosis Orders   1. Women's annual routine gynecological examination  PAP SMEAR    C.trachomatis N.gonorrhoeae DNA, Thin Prep      2. Breast tenderness  POCT urine pregnancy    US RIGHT BREAST LIMITED      3. Pelvic pain  POCT Urinalysis no Micro      4.  Mass of upper outer quadrant of right breast  US RIGHT BREAST LIMITED        Pap, g/c pap, u/s R breast order    Pt encouraged to call/return if pain related to intercourse worsens or persists, she verbalizes understanding    Return if symptoms worsen or fail to improve.     Reva Duane, PA-C

## 2022-11-05 LAB
CHLAMYDIA BY THIN PREP: NEGATIVE
GC BY THIN PREP: NEGATIVE

## 2022-11-15 ENCOUNTER — HOSPITAL ENCOUNTER (OUTPATIENT)
Dept: ULTRASOUND IMAGING | Age: 27
Discharge: HOME | End: 2022-11-15
Payer: MEDICAID

## 2022-11-15 DIAGNOSIS — N63.11 MASS OF UPPER OUTER QUADRANT OF RIGHT BREAST: ICD-10-CM

## 2022-11-15 DIAGNOSIS — N64.4 BREAST TENDERNESS: ICD-10-CM

## 2022-11-15 PROCEDURE — 76642 ULTRASOUND BREAST LIMITED: CPT

## 2022-11-21 DIAGNOSIS — N63.10 MASS OF RIGHT BREAST, UNSPECIFIED QUADRANT: Primary | ICD-10-CM

## 2023-10-30 ENCOUNTER — TELEPHONE (OUTPATIENT)
Dept: OBGYN | Age: 28
End: 2023-10-30

## 2023-10-30 RX ORDER — METRONIDAZOLE 500 MG/1
500 TABLET ORAL 2 TIMES DAILY
Qty: 14 TABLET | Refills: 0 | Status: SHIPPED | OUTPATIENT
Start: 2023-10-30 | End: 2023-11-06

## 2024-12-26 ENCOUNTER — APPOINTMENT (OUTPATIENT)
Dept: CT IMAGING | Age: 29
End: 2024-12-26
Payer: MEDICAID

## 2024-12-26 ENCOUNTER — HOSPITAL ENCOUNTER (EMERGENCY)
Age: 29
Discharge: HOME | End: 2024-12-26
Attending: EMERGENCY MEDICINE
Payer: MEDICAID

## 2024-12-26 VITALS
HEART RATE: 80 BPM | BODY MASS INDEX: 24.25 KG/M2 | HEIGHT: 68 IN | OXYGEN SATURATION: 99 % | TEMPERATURE: 98.6 F | DIASTOLIC BLOOD PRESSURE: 88 MMHG | RESPIRATION RATE: 16 BRPM | WEIGHT: 160 LBS | SYSTOLIC BLOOD PRESSURE: 125 MMHG

## 2024-12-26 DIAGNOSIS — K21.9 GASTROESOPHAGEAL REFLUX DISEASE WITHOUT ESOPHAGITIS: ICD-10-CM

## 2024-12-26 DIAGNOSIS — R10.13 ABDOMINAL PAIN, EPIGASTRIC: Primary | ICD-10-CM

## 2024-12-26 LAB
ALBUMIN SERPL-MCNC: 4.1 G/DL (ref 3.4–5)
ALBUMIN/GLOB SERPL: 1.8 {RATIO} (ref 1.1–2.2)
ALP SERPL-CCNC: 59 U/L (ref 40–129)
ALT SERPL-CCNC: 17 U/L (ref 10–40)
ANION GAP SERPL CALCULATED.3IONS-SCNC: 7 MMOL/L (ref 9–17)
AST SERPL-CCNC: 25 U/L (ref 15–37)
B-HCG SERPL EIA 3RD IS-ACNC: <1 MIU/ML
BASOPHILS # BLD: 0.02 K/UL
BASOPHILS NFR BLD: 0 % (ref 0–1)
BILIRUB SERPL-MCNC: <0.2 MG/DL (ref 0–1)
BILIRUB UR QL STRIP: NEGATIVE
BUN SERPL-MCNC: 11 MG/DL (ref 7–20)
CALCIUM SERPL-MCNC: 9 MG/DL (ref 8.3–10.6)
CHLORIDE SERPL-SCNC: 102 MMOL/L (ref 99–110)
CLARITY UR: CLEAR
CO2 SERPL-SCNC: 28 MMOL/L (ref 21–32)
COLOR UR: YELLOW
COMMENT: NORMAL
CREAT SERPL-MCNC: 0.7 MG/DL (ref 0.6–1.1)
EOSINOPHIL # BLD: 0.06 K/UL
EOSINOPHILS RELATIVE PERCENT: 1 % (ref 0–3)
ERYTHROCYTE [DISTWIDTH] IN BLOOD BY AUTOMATED COUNT: 12 % (ref 11.7–14.9)
GFR, ESTIMATED: >90 ML/MIN/1.73M2
GLUCOSE SERPL-MCNC: 89 MG/DL (ref 74–99)
GLUCOSE UR STRIP-MCNC: NEGATIVE MG/DL
HCT VFR BLD AUTO: 36.8 % (ref 37–47)
HGB BLD-MCNC: 12.5 G/DL (ref 12.5–16)
HGB UR QL STRIP.AUTO: NEGATIVE
IMM GRANULOCYTES # BLD AUTO: 0.01 K/UL
IMM GRANULOCYTES NFR BLD: 0 %
KETONES UR STRIP-MCNC: NEGATIVE MG/DL
LEUKOCYTE ESTERASE UR QL STRIP: NEGATIVE
LIPASE SERPL-CCNC: 21 U/L (ref 13–60)
LYMPHOCYTES NFR BLD: 1.63 K/UL
LYMPHOCYTES RELATIVE PERCENT: 25 % (ref 24–44)
MCH RBC QN AUTO: 30.9 PG (ref 27–31)
MCHC RBC AUTO-ENTMCNC: 34 G/DL (ref 32–36)
MCV RBC AUTO: 90.9 FL (ref 78–100)
MONOCYTES NFR BLD: 0.51 K/UL
MONOCYTES NFR BLD: 8 % (ref 0–4)
NEUTROPHILS NFR BLD: 66 % (ref 36–66)
NEUTS SEG NFR BLD: 4.24 K/UL
NITRITE UR QL STRIP: NEGATIVE
PH UR STRIP: 7.5 [PH] (ref 5–8)
PLATELET # BLD AUTO: 273 K/UL (ref 140–440)
PMV BLD AUTO: 8.8 FL (ref 7.5–11.1)
POTASSIUM SERPL-SCNC: 4.2 MMOL/L (ref 3.5–5.1)
PROT SERPL-MCNC: 6.3 G/DL (ref 6.4–8.2)
PROT UR STRIP-MCNC: NEGATIVE MG/DL
RBC # BLD AUTO: 4.05 M/UL (ref 4.2–5.4)
SODIUM SERPL-SCNC: 137 MMOL/L (ref 136–145)
SP GR UR STRIP: 1.02 (ref 1–1.03)
UROBILINOGEN UR STRIP-ACNC: 0.2 EU/DL (ref 0–1)
WBC OTHER # BLD: 6.5 K/UL (ref 4–10.5)

## 2024-12-26 PROCEDURE — 6360000004 HC RX CONTRAST MEDICATION: Performed by: EMERGENCY MEDICINE

## 2024-12-26 PROCEDURE — 83690 ASSAY OF LIPASE: CPT

## 2024-12-26 PROCEDURE — 85025 COMPLETE CBC W/AUTO DIFF WBC: CPT

## 2024-12-26 PROCEDURE — 96374 THER/PROPH/DIAG INJ IV PUSH: CPT

## 2024-12-26 PROCEDURE — 81003 URINALYSIS AUTO W/O SCOPE: CPT

## 2024-12-26 PROCEDURE — 80053 COMPREHEN METABOLIC PANEL: CPT

## 2024-12-26 PROCEDURE — 6360000002 HC RX W HCPCS: Performed by: EMERGENCY MEDICINE

## 2024-12-26 PROCEDURE — 99285 EMERGENCY DEPT VISIT HI MDM: CPT

## 2024-12-26 PROCEDURE — 74177 CT ABD & PELVIS W/CONTRAST: CPT

## 2024-12-26 PROCEDURE — 6370000000 HC RX 637 (ALT 250 FOR IP): Performed by: EMERGENCY MEDICINE

## 2024-12-26 PROCEDURE — 84702 CHORIONIC GONADOTROPIN TEST: CPT

## 2024-12-26 RX ORDER — PANTOPRAZOLE SODIUM 40 MG/1
40 TABLET, DELAYED RELEASE ORAL
Qty: 60 TABLET | Refills: 0 | Status: SHIPPED | OUTPATIENT
Start: 2024-12-26

## 2024-12-26 RX ORDER — ONDANSETRON 2 MG/ML
4 INJECTION INTRAMUSCULAR; INTRAVENOUS ONCE
Status: COMPLETED | OUTPATIENT
Start: 2024-12-26 | End: 2024-12-26

## 2024-12-26 RX ORDER — DICYCLOMINE HYDROCHLORIDE 10 MG/1
10 CAPSULE ORAL 3 TIMES DAILY PRN
Qty: 120 CAPSULE | Refills: 0 | Status: SHIPPED | OUTPATIENT
Start: 2024-12-26

## 2024-12-26 RX ORDER — MAGNESIUM HYDROXIDE/ALUMINUM HYDROXICE/SIMETHICONE 120; 1200; 1200 MG/30ML; MG/30ML; MG/30ML
30 SUSPENSION ORAL ONCE
Status: COMPLETED | OUTPATIENT
Start: 2024-12-26 | End: 2024-12-26

## 2024-12-26 RX ORDER — LIDOCAINE HYDROCHLORIDE 20 MG/ML
15 SOLUTION OROPHARYNGEAL ONCE
Status: COMPLETED | OUTPATIENT
Start: 2024-12-26 | End: 2024-12-26

## 2024-12-26 RX ORDER — IOPAMIDOL 755 MG/ML
75 INJECTION, SOLUTION INTRAVASCULAR
Status: COMPLETED | OUTPATIENT
Start: 2024-12-26 | End: 2024-12-26

## 2024-12-26 RX ADMIN — ALUMINUM HYDROXIDE, MAGNESIUM HYDROXIDE, AND SIMETHICONE 30 ML: 200; 200; 20 SUSPENSION ORAL at 17:24

## 2024-12-26 RX ADMIN — LIDOCAINE HYDROCHLORIDE 15 ML: 20 SOLUTION ORAL at 17:24

## 2024-12-26 RX ADMIN — IOPAMIDOL 75 ML: 755 INJECTION, SOLUTION INTRAVENOUS at 19:51

## 2024-12-26 RX ADMIN — ONDANSETRON 4 MG: 2 INJECTION INTRAMUSCULAR; INTRAVENOUS at 17:23

## 2024-12-26 ASSESSMENT — PAIN DESCRIPTION - ORIENTATION: ORIENTATION: INNER;UPPER

## 2024-12-26 ASSESSMENT — PAIN DESCRIPTION - DESCRIPTORS
DESCRIPTORS: BURNING;DISCOMFORT
DESCRIPTORS: BURNING

## 2024-12-26 ASSESSMENT — PAIN - FUNCTIONAL ASSESSMENT
PAIN_FUNCTIONAL_ASSESSMENT: PREVENTS OR INTERFERES SOME ACTIVE ACTIVITIES AND ADLS
PAIN_FUNCTIONAL_ASSESSMENT: 0-10
PAIN_FUNCTIONAL_ASSESSMENT: 0-10

## 2024-12-26 ASSESSMENT — PAIN SCALES - GENERAL
PAINLEVEL_OUTOF10: 7
PAINLEVEL_OUTOF10: 7

## 2024-12-26 ASSESSMENT — PAIN DESCRIPTION - LOCATION
LOCATION: ABDOMEN
LOCATION: ABDOMEN

## 2024-12-26 NOTE — ED PROVIDER NOTES
EMERGENCY DEPARTMENT ENCOUNTER      CHIEF COMPLAINT:   Epigastric abdominal    HPI: Nalini Rodríguez is a 29 y.o. female who presents to the Emergency Department complaining of burning epigastric abdominal pain.  The patient states that has been present for 2 days.  The pain is located in the upper abdomen and feels like a burning pain.  She states that sometimes this pain is worse with eating and other times eating helps it.  She has a history of acid reflux for which she takes over-the-counter medications.  She denies vomiting, diarrhea or constipation.  She denies urinary complaints. The patient denies fevers, chills, hematemesis, bloody stools, flank pain, or any other complaints.      REVIEW OF SYSTEMS:  CONSTITUTIONAL:  Denies fever, chills, weight loss or weakness  EYES:  Denies photophobia or discharge  ENT:  Denies sore throat or ear pain  CARDIOVASCULAR:  Denies chest pain, palpitations or swelling  RESPIRATORY:  Denies cough or shortness of breath  GI: See HPI  MUSCULOSKELETAL:  Denies back pain  SKIN:  No rash  NEUROLOGIC:  Denies headache, focal weakness or sensory changes  All systems negative except as marked.  \"Remaining review of systems reviewed and negative. I have reviewed the nursing triage documentation and agree unless otherwise noted below.\"    PAST MEDICAL HISTORY:   Past Medical History:   Diagnosis Date    Abnormal uterine bleeding (AUB)     Anxiety     Breast pain     History of miscarriage     History of sexually transmitted disease     Irregular menses     UTI (urinary tract infection)        CURRENT MEDICATIONS:   Home medications reviewed.    SURGICAL HISTORY:   No past surgical history on file.    FAMILY HISTORY:   Family History   Problem Relation Age of Onset    Cancer Mother     Diabetes Mother     Other Mother     Breast Cancer Maternal Grandmother     Breast Cancer Paternal Grandmother     Cancer Paternal Grandmother        SOCIAL HISTORY:   Social History     Socioeconomic History

## 2024-12-26 NOTE — ED TRIAGE NOTES
Pt to the ED with c/o abdominal pain and burning for the couple days. Pt states that she has to eat standing up because her food is getting stuck, and after she eats she is having intense burning and is unable to lay down. She says that she has had issues for the last year or so but has not gotten seen. She has taken two gas ex and a xantac.

## 2025-03-21 ENCOUNTER — HOSPITAL ENCOUNTER (OUTPATIENT)
Age: 30
Setting detail: SPECIMEN
Discharge: HOME | End: 2025-03-21

## 2025-03-21 ENCOUNTER — OFFICE VISIT (OUTPATIENT)
Dept: OBGYN | Age: 30
End: 2025-03-21

## 2025-03-21 VITALS — SYSTOLIC BLOOD PRESSURE: 111 MMHG | DIASTOLIC BLOOD PRESSURE: 78 MMHG | HEART RATE: 86 BPM

## 2025-03-21 DIAGNOSIS — N89.8 VAGINAL DISCHARGE: ICD-10-CM

## 2025-03-21 DIAGNOSIS — N63.21 MASS OF UPPER OUTER QUADRANT OF LEFT BREAST: ICD-10-CM

## 2025-03-21 DIAGNOSIS — N64.4 MASTALGIA: ICD-10-CM

## 2025-03-21 DIAGNOSIS — Z01.419 ENCOUNTER FOR ANNUAL ROUTINE GYNECOLOGICAL EXAMINATION: Primary | ICD-10-CM

## 2025-03-21 RX ORDER — METRONIDAZOLE 500 MG/1
500 TABLET ORAL 2 TIMES DAILY
Qty: 14 TABLET | Refills: 0 | Status: SHIPPED | OUTPATIENT
Start: 2025-03-21 | End: 2025-03-28

## 2025-03-21 SDOH — ECONOMIC STABILITY: FOOD INSECURITY: WITHIN THE PAST 12 MONTHS, THE FOOD YOU BOUGHT JUST DIDN'T LAST AND YOU DIDN'T HAVE MONEY TO GET MORE.: SOMETIMES TRUE

## 2025-03-21 SDOH — ECONOMIC STABILITY: INCOME INSECURITY: IN THE LAST 12 MONTHS, WAS THERE A TIME WHEN YOU WERE NOT ABLE TO PAY THE MORTGAGE OR RENT ON TIME?: YES

## 2025-03-21 SDOH — ECONOMIC STABILITY: TRANSPORTATION INSECURITY
IN THE PAST 12 MONTHS, HAS THE LACK OF TRANSPORTATION KEPT YOU FROM MEDICAL APPOINTMENTS OR FROM GETTING MEDICATIONS?: NO

## 2025-03-21 SDOH — ECONOMIC STABILITY: FOOD INSECURITY: WITHIN THE PAST 12 MONTHS, YOU WORRIED THAT YOUR FOOD WOULD RUN OUT BEFORE YOU GOT MONEY TO BUY MORE.: NEVER TRUE

## 2025-03-21 SDOH — ECONOMIC STABILITY: TRANSPORTATION INSECURITY
IN THE PAST 12 MONTHS, HAS LACK OF TRANSPORTATION KEPT YOU FROM MEETINGS, WORK, OR FROM GETTING THINGS NEEDED FOR DAILY LIVING?: NO

## 2025-03-21 ASSESSMENT — PATIENT HEALTH QUESTIONNAIRE - PHQ9
1. LITTLE INTEREST OR PLEASURE IN DOING THINGS: NOT AT ALL
SUM OF ALL RESPONSES TO PHQ QUESTIONS 1-9: 0
2. FEELING DOWN, DEPRESSED OR HOPELESS: NOT AT ALL
SUM OF ALL RESPONSES TO PHQ QUESTIONS 1-9: 0

## 2025-03-21 ASSESSMENT — ENCOUNTER SYMPTOMS
NAUSEA: 0
DIARRHEA: 0
CHEST TIGHTNESS: 0
VOMITING: 0
SHORTNESS OF BREATH: 0
CONSTIPATION: 0
RESPIRATORY NEGATIVE: 1
ABDOMINAL PAIN: 0
GASTROINTESTINAL NEGATIVE: 1

## 2025-03-21 NOTE — PROGRESS NOTES
3/23/25    Nalini Rodríguez  1995    Chief Complaint   Patient presents with    Annual Exam     Annual exam. Smoker  No known h/o dvt.  No LMP recorded..Periods are regular.Patient is sexually active. Patient is not on birth control. Pap Smear was   and results were negative HPV not ordered. Patient complains of vaginal discharge and irritation. Pt would like to discuss sti panel.        The patient is a 30 y.o. female,  who presents for her annual exam.  She is menstruating normally.  She is  sexually active. She is not currently taking birth control.    Reports increased vaginal discharge, irritation over the past few days. Has taken 2 doses of diflucan without much relief.     Reports bilateral breast pain for months, lumps that come and go. Reports history of abnormal breast ultrasound.    Pap smear history: Her last PAP smear was in 2022.  Her results were normal.    Past Medical History:   Diagnosis Date    Abnormal uterine bleeding (AUB)     Anxiety     Breast pain     History of miscarriage     History of sexually transmitted disease     Irregular menses     UTI (urinary tract infection)        No past surgical history on file.    Family History   Problem Relation Age of Onset    Cancer Mother     Diabetes Mother     Other Mother     Breast Cancer Maternal Grandmother     Breast Cancer Paternal Grandmother     Cancer Paternal Grandmother        Social History     Tobacco Use    Smoking status: Former    Smokeless tobacco: Never   Vaping Use    Vaping status: Some Days   Substance Use Topics    Alcohol use: No     Comment: occasionally    Drug use: Yes     Types: Marijuana (Weed)     Comment: everyday       Current Outpatient Medications   Medication Sig Dispense Refill    metroNIDAZOLE (FLAGYL) 500 MG tablet Take 1 tablet by mouth 2 times daily for 7 days 14 tablet 0    pantoprazole (PROTONIX) 40 MG tablet Take 1 tablet by mouth 2 times daily (before meals) 60 tablet 0    dicyclomine (BENTYL)

## 2025-03-22 LAB
BV BACTERIA DNA VAG QL NAA+PROBE: DETECTED
C GLABRATA DNA VAG QL NAA+PROBE: ABNORMAL
C GLABRATA DNA VAG QL NAA+PROBE: NOT DETECTED
C KRUSEI DNA VAG QL NAA+PROBE: NOT DETECTED
CANDIDA DNA VAG QL NAA+PROBE: NOT DETECTED
T VAGINALIS DNA VAG QL NAA+PROBE: NOT DETECTED

## 2025-03-23 ENCOUNTER — RESULTS FOLLOW-UP (OUTPATIENT)
Dept: OBGYN | Age: 30
End: 2025-03-23

## 2025-03-25 LAB
C TRACH SPEC QL CULT: NEGATIVE
COMMENT: ABNORMAL
HPV OTHER HR TYPES: NOT DETECTED
HPV TYPE 16: DETECTED
HPV TYPE 18: NOT DETECTED
NEISSERIA GONORRHOEAE, CULTURE: NEGATIVE

## 2025-04-01 ENCOUNTER — RESULTS FOLLOW-UP (OUTPATIENT)
Dept: OBGYN | Age: 30
End: 2025-04-01

## 2025-04-01 LAB — GYNECOLOGY CYTOLOGY REPORT: NORMAL

## 2025-04-04 ENCOUNTER — TELEPHONE (OUTPATIENT)
Dept: OBGYN | Age: 30
End: 2025-04-04

## 2025-04-04 RX ORDER — METRONIDAZOLE 7.5 MG/G
GEL VAGINAL
Qty: 5 EACH | Refills: 0 | Status: SHIPPED | OUTPATIENT
Start: 2025-04-04

## 2025-04-04 NOTE — TELEPHONE ENCOUNTER
Pt would like to know if Metrogel can be called in. Pt tried taking Flagyl and it made her sick. Please advise.

## 2025-04-17 ENCOUNTER — PROCEDURE VISIT (OUTPATIENT)
Dept: OBGYN | Age: 30
End: 2025-04-17

## 2025-04-17 ENCOUNTER — HOSPITAL ENCOUNTER (OUTPATIENT)
Age: 30
Setting detail: SPECIMEN
Discharge: HOME | End: 2025-04-17

## 2025-04-17 VITALS
DIASTOLIC BLOOD PRESSURE: 79 MMHG | HEART RATE: 72 BPM | BODY MASS INDEX: 26.07 KG/M2 | SYSTOLIC BLOOD PRESSURE: 106 MMHG | HEIGHT: 68 IN | WEIGHT: 172 LBS

## 2025-04-17 DIAGNOSIS — N74 FEMALE PELVIC INFLAMMATORY DISORDERS IN DISEASES CLASSIFIED ELSEWHERE: ICD-10-CM

## 2025-04-17 DIAGNOSIS — N89.8 VAGINAL DISCHARGE: ICD-10-CM

## 2025-04-17 DIAGNOSIS — R87.810 CERVICAL HIGH RISK HUMAN PAPILLOMAVIRUS (HPV) DNA TEST POSITIVE: Primary | ICD-10-CM

## 2025-04-17 NOTE — PROGRESS NOTES
Colposcopy Procedure Note    Indications: Pt presents for colposcopy secondary to + hpv 18    Procedure Details   The risks and benefits of the procedure were discussed in detail with the patient. She was aware the risks may include, but are not limited to bleeding, infection and damage to surround structures. She acknowledged these risks and elected to proceed. Written consent was obtained.      A speculum was placed in vagina and excellent visualization of cervix was achieved, the cervix was swabbed x3 with acetic acid solution. awe lesions were visualized at 7:00     Biopsies were obtained at none. ECC was performed.     Colposcopy was  satisfactory .       Findings:  Cervix: awe at 7:00  Vaginal inspection: Normal without gross visible lesions  Vulvar inspection: Normal without gross visible lesions    Specimens: ecc, 7:00 cervical biopsy    Complications: none    Plan:  Specimens labeled and sent to Pathology  Aftercare instructions were discussed  Will follow up 6m unless abnormal biopsy and ecc  Patient to call with any further questions or concerns    Villa Fountain MD       4/17/25    Nalini PRATER Anne  1995    Chief Complaint   Patient presents with    Procedure     Colposcopy today. Consent signed. Date of last Cervical Cancer screen (HPV or PAP): 3/21/2025 Reactive cellular changes associated with inflammation. +16 genotype.         Nalini Rodríguez is a 30 y.o. female who presents today for evaluation of vaginal discharge      Recent bv    Also took otc yeast medication    Past Medical History:   Diagnosis Date    Abnormal uterine bleeding (AUB)     Anxiety     Breast pain     History of miscarriage     History of sexually transmitted disease     Irregular menses     UTI (urinary tract infection)        No past surgical history on file.    Social History     Tobacco Use    Smoking status: Former    Smokeless tobacco: Never   Vaping Use    Vaping status: Some Days   Substance Use Topics

## 2025-04-18 LAB
BV BACTERIA DNA VAG QL NAA+PROBE: NOT DETECTED
C GLABRATA DNA VAG QL NAA+PROBE: NORMAL
C GLABRATA DNA VAG QL NAA+PROBE: NOT DETECTED
C KRUSEI DNA VAG QL NAA+PROBE: NOT DETECTED
CANDIDA DNA VAG QL NAA+PROBE: NOT DETECTED
T VAGINALIS DNA VAG QL NAA+PROBE: NOT DETECTED

## 2025-04-21 LAB — SURGICAL PATHOLOGY REPORT: NORMAL

## 2025-04-23 LAB — NON-GYN CYTOLOGY REPORT: NORMAL

## 2025-05-05 ENCOUNTER — HOSPITAL ENCOUNTER (OUTPATIENT)
Dept: ULTRASOUND IMAGING | Age: 30
Discharge: HOME | End: 2025-05-05
Payer: MEDICAID

## 2025-05-05 ENCOUNTER — HOSPITAL ENCOUNTER (OUTPATIENT)
Dept: WOMENS IMAGING | Age: 30
Discharge: HOME | End: 2025-05-05
Payer: MEDICAID

## 2025-05-05 VITALS — HEIGHT: 69 IN | WEIGHT: 170 LBS | BODY MASS INDEX: 25.18 KG/M2

## 2025-05-05 DIAGNOSIS — N63.21 MASS OF UPPER OUTER QUADRANT OF LEFT BREAST: ICD-10-CM

## 2025-05-05 DIAGNOSIS — N64.4 MASTALGIA: ICD-10-CM

## 2025-05-05 PROCEDURE — 76642 ULTRASOUND BREAST LIMITED: CPT

## 2025-05-05 PROCEDURE — G0279 TOMOSYNTHESIS, MAMMO: HCPCS

## 2025-05-22 ENCOUNTER — OFFICE VISIT (OUTPATIENT)
Dept: OBGYN | Age: 30
End: 2025-05-22
Payer: MEDICAID

## 2025-05-22 VITALS
WEIGHT: 170 LBS | DIASTOLIC BLOOD PRESSURE: 97 MMHG | HEIGHT: 68 IN | SYSTOLIC BLOOD PRESSURE: 120 MMHG | HEART RATE: 81 BPM | BODY MASS INDEX: 25.76 KG/M2

## 2025-05-22 DIAGNOSIS — N90.89 VULVAR LESION: Primary | ICD-10-CM

## 2025-05-22 PROCEDURE — G8419 CALC BMI OUT NRM PARAM NOF/U: HCPCS | Performed by: OBSTETRICS & GYNECOLOGY

## 2025-05-22 PROCEDURE — 99214 OFFICE O/P EST MOD 30 MIN: CPT | Performed by: OBSTETRICS & GYNECOLOGY

## 2025-05-22 PROCEDURE — 1036F TOBACCO NON-USER: CPT | Performed by: OBSTETRICS & GYNECOLOGY

## 2025-05-22 PROCEDURE — G8427 DOCREV CUR MEDS BY ELIG CLIN: HCPCS | Performed by: OBSTETRICS & GYNECOLOGY

## 2025-05-22 PROCEDURE — 36415 COLL VENOUS BLD VENIPUNCTURE: CPT | Performed by: OBSTETRICS & GYNECOLOGY

## 2025-05-22 RX ORDER — FLUCONAZOLE 150 MG/1
150 TABLET ORAL ONCE
Qty: 1 TABLET | Refills: 0 | Status: SHIPPED | OUTPATIENT
Start: 2025-05-22 | End: 2025-05-22

## 2025-05-22 SDOH — ECONOMIC STABILITY: FOOD INSECURITY: WITHIN THE PAST 12 MONTHS, YOU WORRIED THAT YOUR FOOD WOULD RUN OUT BEFORE YOU GOT MONEY TO BUY MORE.: NEVER TRUE

## 2025-05-22 SDOH — ECONOMIC STABILITY: FOOD INSECURITY: WITHIN THE PAST 12 MONTHS, THE FOOD YOU BOUGHT JUST DIDN'T LAST AND YOU DIDN'T HAVE MONEY TO GET MORE.: NEVER TRUE

## 2025-05-22 NOTE — PROGRESS NOTES
25    Nalini Rodríguez  1995    Chief Complaint   Patient presents with    Other     Pt noticed lesion on vagina this morning. No drainage, flesh color. Pt also reports vaginal burning. Pt states amoxicillin 2 days ago for tooth infection and usually gets yeast infection.         Nalini Rodríguez is a 30 y.o. female who presents today for evaluation of see above.    Past Medical History:   Diagnosis Date    Abnormal uterine bleeding (AUB)     Anxiety     Breast pain     History of miscarriage     History of sexually transmitted disease     Irregular menses     UTI (urinary tract infection)        No past surgical history on file.    Social History     Tobacco Use    Smoking status: Former    Smokeless tobacco: Never   Vaping Use    Vaping status: Some Days   Substance Use Topics    Alcohol use: No     Comment: occasionally    Drug use: Yes     Types: Marijuana (Weed)     Comment: everyday       Family History   Problem Relation Age of Onset    Cancer Mother     Diabetes Mother     Breast Cancer Maternal Grandmother 75    Breast Cancer Paternal Grandmother 45    Cancer Paternal Grandmother     Ovarian Cancer Neg Hx        Current Outpatient Medications   Medication Sig Dispense Refill    fluconazole (DIFLUCAN) 150 MG tablet Take 1 tablet by mouth once for 1 dose May repeat in 3-5 days, if symptoms persist or recur. 1 tablet 0    metroNIDAZOLE (METROGEL) 0.75 % vaginal gel Insert 1 applicator full (37.5mg) by vaginal route daily at bedtime for 5 nights 5 each 0    pantoprazole (PROTONIX) 40 MG tablet Take 1 tablet by mouth 2 times daily (before meals) 60 tablet 0    dicyclomine (BENTYL) 10 MG capsule Take 1 capsule by mouth 3 times daily as needed (Abdominal pain, gas pains) 120 capsule 0     No current facility-administered medications for this visit.       No Known Allergies        Immunization History   Administered Date(s) Administered    COVID-19, PFIZER PURPLE top, DILUTE for use, (age 12 y+),

## 2025-05-23 LAB
C TRACH DNA SPEC QL NAA+PROBE: NOT DETECTED
CANDIDA RRNA VAG QL PROBE: NOT DETECTED
CANDIDA RRNA VAG QL PROBE: NOT DETECTED
CARBAPENEM RESISTANCE OXA-48 GENE BY PCR: NOT DETECTED
CEPHALOSPORIN RESISTANCE AMPC GENE: NOT DETECTED
ESBL RESISTANCE: NOT DETECTED
G VAGINALIS RRNA GENITAL QL PROBE: NOT DETECTED
M HOMINIS DNA BLD QL NAA+PROBE: NOT DETECTED
MACROLIDE RESISTANCE: NOT DETECTED
METHICILLIN RESISTANCE: NOT DETECTED
MYCOPLASMA DNA SPEC QL NAA+PROBE: NOT DETECTED
N GONORRHOEA DNA SPEC QL NAA+PROBE: NOT DETECTED
OTHER MICROORG DNA SPEC QL NAA+PROBE: DETECTED
OTHER MICROORG DNA SPEC QL NAA+PROBE: NOT DETECTED
QUINOLONE AND FLUOROQUINOLONE RESISTANCE: NOT DETECTED
SPECIMEN SOURCE: NORMAL
T VAGINALIS RRNA SPEC QL NAA+PROBE: NOT DETECTED
TETRACYCLINE RESISTANCE: NOT DETECTED
TRIMETHOPRIM/SULFONAMIDE RESISTANCE: NOT DETECTED

## 2025-05-26 LAB
HSV1 DNA CSF QL NAA+PROBE: NOT DETECTED
HSV2 DNA CSF QL NAA+PROBE: NOT DETECTED
SPECIMEN SOURCE: NORMAL